# Patient Record
Sex: FEMALE | NOT HISPANIC OR LATINO
[De-identification: names, ages, dates, MRNs, and addresses within clinical notes are randomized per-mention and may not be internally consistent; named-entity substitution may affect disease eponyms.]

---

## 2017-10-06 PROBLEM — Z00.00 ENCOUNTER FOR PREVENTIVE HEALTH EXAMINATION: Status: ACTIVE | Noted: 2017-10-06

## 2017-10-10 ENCOUNTER — APPOINTMENT (OUTPATIENT)
Dept: OTHER | Facility: CLINIC | Age: 37
End: 2017-10-10
Payer: COMMERCIAL

## 2017-10-10 PROCEDURE — 99243 OFF/OP CNSLTJ NEW/EST LOW 30: CPT

## 2017-10-13 ENCOUNTER — OUTPATIENT (OUTPATIENT)
Dept: OUTPATIENT SERVICES | Facility: HOSPITAL | Age: 37
LOS: 1 days | End: 2017-10-13
Payer: COMMERCIAL

## 2017-10-13 ENCOUNTER — APPOINTMENT (OUTPATIENT)
Dept: ANTEPARTUM | Facility: CLINIC | Age: 37
End: 2017-10-13
Payer: COMMERCIAL

## 2017-10-13 ENCOUNTER — ASOB RESULT (OUTPATIENT)
Age: 37
End: 2017-10-13

## 2017-10-13 DIAGNOSIS — Z3A.00 WEEKS OF GESTATION OF PREGNANCY NOT SPECIFIED: ICD-10-CM

## 2017-10-13 DIAGNOSIS — O26.899 OTHER SPECIFIED PREGNANCY RELATED CONDITIONS, UNSPECIFIED TRIMESTER: ICD-10-CM

## 2017-10-13 LAB
BASOPHILS # BLD AUTO: 0 K/UL — SIGNIFICANT CHANGE UP (ref 0–0.2)
BASOPHILS NFR BLD AUTO: 0.3 % — SIGNIFICANT CHANGE UP (ref 0–2)
BLD GP AB SCN SERPL QL: NEGATIVE — SIGNIFICANT CHANGE UP
EOSINOPHIL # BLD AUTO: 0.1 K/UL — SIGNIFICANT CHANGE UP (ref 0–0.5)
EOSINOPHIL NFR BLD AUTO: 0.5 % — SIGNIFICANT CHANGE UP (ref 0–6)
HCT VFR BLD CALC: 30.8 % — LOW (ref 34.5–45)
HGB BLD-MCNC: 10.8 G/DL — LOW (ref 11.5–15.5)
LYMPHOCYTES # BLD AUTO: 16.9 % — SIGNIFICANT CHANGE UP (ref 13–44)
LYMPHOCYTES # BLD AUTO: 2.2 K/UL — SIGNIFICANT CHANGE UP (ref 1–3.3)
MCHC RBC-ENTMCNC: 33.3 PG — SIGNIFICANT CHANGE UP (ref 27–34)
MCHC RBC-ENTMCNC: 35.1 GM/DL — SIGNIFICANT CHANGE UP (ref 32–36)
MCV RBC AUTO: 95 FL — SIGNIFICANT CHANGE UP (ref 80–100)
MONOCYTES # BLD AUTO: 1 K/UL — HIGH (ref 0–0.9)
MONOCYTES NFR BLD AUTO: 8 % — SIGNIFICANT CHANGE UP (ref 2–14)
NEUTROPHILS # BLD AUTO: 9.6 K/UL — HIGH (ref 1.8–7.4)
NEUTROPHILS NFR BLD AUTO: 74.3 % — SIGNIFICANT CHANGE UP (ref 43–77)
PLATELET # BLD AUTO: 363 K/UL — SIGNIFICANT CHANGE UP (ref 150–400)
RBC # BLD: 3.24 M/UL — LOW (ref 3.8–5.2)
RBC # FLD: 11.9 % — SIGNIFICANT CHANGE UP (ref 10.3–14.5)
RH IG SCN BLD-IMP: POSITIVE — SIGNIFICANT CHANGE UP
WBC # BLD: 12.9 K/UL — HIGH (ref 3.8–10.5)
WBC # FLD AUTO: 12.9 K/UL — HIGH (ref 3.8–10.5)

## 2017-10-13 PROCEDURE — G0463: CPT

## 2017-10-13 PROCEDURE — 85027 COMPLETE CBC AUTOMATED: CPT

## 2017-10-13 PROCEDURE — 86850 RBC ANTIBODY SCREEN: CPT

## 2017-10-13 PROCEDURE — 86900 BLOOD TYPING SEROLOGIC ABO: CPT

## 2017-10-13 PROCEDURE — 76817 TRANSVAGINAL US OBSTETRIC: CPT

## 2017-10-13 PROCEDURE — 59025 FETAL NON-STRESS TEST: CPT

## 2017-10-13 PROCEDURE — 76818 FETAL BIOPHYS PROFILE W/NST: CPT

## 2017-10-13 PROCEDURE — 86901 BLOOD TYPING SEROLOGIC RH(D): CPT

## 2017-10-13 PROCEDURE — 76818 FETAL BIOPHYS PROFILE W/NST: CPT | Mod: 26

## 2017-10-13 PROCEDURE — 99242 OFF/OP CONSLTJ NEW/EST SF 20: CPT | Mod: 25

## 2017-10-13 PROCEDURE — 86780 TREPONEMA PALLIDUM: CPT

## 2017-10-13 PROCEDURE — 76816 OB US FOLLOW-UP PER FETUS: CPT | Mod: 26

## 2017-10-13 PROCEDURE — 76816 OB US FOLLOW-UP PER FETUS: CPT

## 2017-10-13 PROCEDURE — 76817 TRANSVAGINAL US OBSTETRIC: CPT | Mod: 26

## 2017-10-13 RX ORDER — SODIUM CHLORIDE 9 MG/ML
1000 INJECTION INTRAMUSCULAR; INTRAVENOUS; SUBCUTANEOUS ONCE
Qty: 0 | Refills: 0 | Status: DISCONTINUED | OUTPATIENT
Start: 2017-10-13 | End: 2017-10-28

## 2017-10-14 LAB — T PALLIDUM AB TITR SER: NEGATIVE — SIGNIFICANT CHANGE UP

## 2017-10-17 ENCOUNTER — ASOB RESULT (OUTPATIENT)
Age: 37
End: 2017-10-17

## 2017-10-17 ENCOUNTER — APPOINTMENT (OUTPATIENT)
Dept: ANTEPARTUM | Facility: CLINIC | Age: 37
End: 2017-10-17
Payer: COMMERCIAL

## 2017-10-17 PROCEDURE — 76817 TRANSVAGINAL US OBSTETRIC: CPT

## 2017-10-18 DIAGNOSIS — O40.3XX0 POLYHYDRAMNIOS, THIRD TRIMESTER, NOT APPLICABLE OR UNSPECIFIED: ICD-10-CM

## 2017-10-18 DIAGNOSIS — Z01.818 ENCOUNTER FOR OTHER PREPROCEDURAL EXAMINATION: ICD-10-CM

## 2017-10-18 DIAGNOSIS — O09.513 SUPERVISION OF ELDERLY PRIMIGRAVIDA, THIRD TRIMESTER: ICD-10-CM

## 2017-10-23 ENCOUNTER — ASOB RESULT (OUTPATIENT)
Age: 37
End: 2017-10-23

## 2017-10-23 ENCOUNTER — APPOINTMENT (OUTPATIENT)
Dept: ANTEPARTUM | Facility: CLINIC | Age: 37
End: 2017-10-23
Payer: COMMERCIAL

## 2017-10-23 PROCEDURE — 76815 OB US LIMITED FETUS(S): CPT

## 2017-10-30 ENCOUNTER — ASOB RESULT (OUTPATIENT)
Age: 37
End: 2017-10-30

## 2017-10-30 ENCOUNTER — OUTPATIENT (OUTPATIENT)
Dept: OUTPATIENT SERVICES | Facility: HOSPITAL | Age: 37
LOS: 1 days | End: 2017-10-30
Payer: COMMERCIAL

## 2017-10-30 ENCOUNTER — APPOINTMENT (OUTPATIENT)
Dept: ANTEPARTUM | Facility: CLINIC | Age: 37
End: 2017-10-30
Payer: COMMERCIAL

## 2017-10-30 DIAGNOSIS — Z01.818 ENCOUNTER FOR OTHER PREPROCEDURAL EXAMINATION: ICD-10-CM

## 2017-10-30 PROCEDURE — 76818 FETAL BIOPHYS PROFILE W/NST: CPT | Mod: 26

## 2017-10-30 PROCEDURE — 99214 OFFICE O/P EST MOD 30 MIN: CPT | Mod: 25

## 2017-10-30 PROCEDURE — 76817 TRANSVAGINAL US OBSTETRIC: CPT

## 2017-10-30 PROCEDURE — 76817 TRANSVAGINAL US OBSTETRIC: CPT | Mod: 26

## 2017-10-30 PROCEDURE — 76818 FETAL BIOPHYS PROFILE W/NST: CPT

## 2017-11-02 DIAGNOSIS — O09.513 SUPERVISION OF ELDERLY PRIMIGRAVIDA, THIRD TRIMESTER: ICD-10-CM

## 2017-11-02 DIAGNOSIS — O99.013 ANEMIA COMPLICATING PREGNANCY, THIRD TRIMESTER: ICD-10-CM

## 2017-11-06 ENCOUNTER — OUTPATIENT (OUTPATIENT)
Dept: OUTPATIENT SERVICES | Facility: HOSPITAL | Age: 37
LOS: 1 days | End: 2017-11-06
Payer: COMMERCIAL

## 2017-11-06 ENCOUNTER — APPOINTMENT (OUTPATIENT)
Dept: ANTEPARTUM | Facility: CLINIC | Age: 37
End: 2017-11-06
Payer: COMMERCIAL

## 2017-11-06 ENCOUNTER — ASOB RESULT (OUTPATIENT)
Age: 37
End: 2017-11-06

## 2017-11-06 ENCOUNTER — INPATIENT (INPATIENT)
Facility: HOSPITAL | Age: 37
LOS: 20 days | Discharge: HOME | End: 2017-11-27
Attending: OBSTETRICS & GYNECOLOGY | Admitting: OBSTETRICS & GYNECOLOGY
Payer: COMMERCIAL

## 2017-11-06 VITALS
TEMPERATURE: 98 F | SYSTOLIC BLOOD PRESSURE: 107 MMHG | DIASTOLIC BLOOD PRESSURE: 72 MMHG | RESPIRATION RATE: 18 BRPM | WEIGHT: 182.1 LBS | HEART RATE: 93 BPM | HEIGHT: 64 IN | OXYGEN SATURATION: 97 %

## 2017-11-06 DIAGNOSIS — O26.899 OTHER SPECIFIED PREGNANCY RELATED CONDITIONS, UNSPECIFIED TRIMESTER: ICD-10-CM

## 2017-11-06 DIAGNOSIS — Z01.818 ENCOUNTER FOR OTHER PREPROCEDURAL EXAMINATION: ICD-10-CM

## 2017-11-06 DIAGNOSIS — Z3A.00 WEEKS OF GESTATION OF PREGNANCY NOT SPECIFIED: ICD-10-CM

## 2017-11-06 DIAGNOSIS — Z34.80 ENCOUNTER FOR SUPERVISION OF OTHER NORMAL PREGNANCY, UNSPECIFIED TRIMESTER: ICD-10-CM

## 2017-11-06 LAB
BASOPHILS # BLD AUTO: 0.1 K/UL — SIGNIFICANT CHANGE UP (ref 0–0.2)
BASOPHILS NFR BLD AUTO: 0.4 % — SIGNIFICANT CHANGE UP (ref 0–2)
BLD GP AB SCN SERPL QL: NEGATIVE — SIGNIFICANT CHANGE UP
EOSINOPHIL # BLD AUTO: 0 K/UL — SIGNIFICANT CHANGE UP (ref 0–0.5)
EOSINOPHIL NFR BLD AUTO: 0.3 % — SIGNIFICANT CHANGE UP (ref 0–6)
HCT VFR BLD CALC: 32.2 % — LOW (ref 34.5–45)
HGB BLD-MCNC: 10.9 G/DL — LOW (ref 11.5–15.5)
LYMPHOCYTES # BLD AUTO: 14.8 % — SIGNIFICANT CHANGE UP (ref 13–44)
LYMPHOCYTES # BLD AUTO: 2 K/UL — SIGNIFICANT CHANGE UP (ref 1–3.3)
MCHC RBC-ENTMCNC: 31.7 PG — SIGNIFICANT CHANGE UP (ref 27–34)
MCHC RBC-ENTMCNC: 34 GM/DL — SIGNIFICANT CHANGE UP (ref 32–36)
MCV RBC AUTO: 93.4 FL — SIGNIFICANT CHANGE UP (ref 80–100)
MONOCYTES # BLD AUTO: 1.1 K/UL — HIGH (ref 0–0.9)
MONOCYTES NFR BLD AUTO: 8 % — SIGNIFICANT CHANGE UP (ref 2–14)
NEUTROPHILS # BLD AUTO: 10.6 K/UL — HIGH (ref 1.8–7.4)
NEUTROPHILS NFR BLD AUTO: 76.5 % — SIGNIFICANT CHANGE UP (ref 43–77)
PLATELET # BLD AUTO: 359 K/UL — SIGNIFICANT CHANGE UP (ref 150–400)
RBC # BLD: 3.44 M/UL — LOW (ref 3.8–5.2)
RBC # FLD: 12.1 % — SIGNIFICANT CHANGE UP (ref 10.3–14.5)
RH IG SCN BLD-IMP: POSITIVE — SIGNIFICANT CHANGE UP
T PALLIDUM AB TITR SER: NEGATIVE — SIGNIFICANT CHANGE UP
WBC # BLD: 13.8 K/UL — HIGH (ref 3.8–10.5)
WBC # FLD AUTO: 13.8 K/UL — HIGH (ref 3.8–10.5)

## 2017-11-06 PROCEDURE — 76816 OB US FOLLOW-UP PER FETUS: CPT

## 2017-11-06 PROCEDURE — 76818 FETAL BIOPHYS PROFILE W/NST: CPT | Mod: 26

## 2017-11-06 PROCEDURE — 76819 FETAL BIOPHYS PROFIL W/O NST: CPT | Mod: 59

## 2017-11-06 PROCEDURE — 99214 OFFICE O/P EST MOD 30 MIN: CPT | Mod: 25

## 2017-11-06 PROCEDURE — 59025 FETAL NON-STRESS TEST: CPT | Mod: 26

## 2017-11-06 PROCEDURE — 76819 FETAL BIOPHYS PROFIL W/O NST: CPT

## 2017-11-06 PROCEDURE — 76817 TRANSVAGINAL US OBSTETRIC: CPT

## 2017-11-06 PROCEDURE — 76818 FETAL BIOPHYS PROFILE W/NST: CPT

## 2017-11-06 RX ORDER — FOLIC ACID 0.8 MG
1 TABLET ORAL DAILY
Qty: 0 | Refills: 0 | Status: DISCONTINUED | OUTPATIENT
Start: 2017-11-06 | End: 2017-11-27

## 2017-11-06 RX ORDER — ALBUTEROL 90 UG/1
2 AEROSOL, METERED ORAL EVERY 6 HOURS
Qty: 0 | Refills: 0 | Status: DISCONTINUED | OUTPATIENT
Start: 2017-11-06 | End: 2017-11-27

## 2017-11-06 RX ADMIN — Medication 12 MILLIGRAM(S): at 16:41

## 2017-11-06 RX ADMIN — Medication 1 MILLIGRAM(S): at 16:41

## 2017-11-06 NOTE — H&P ADULT - ATTENDING COMMENTS
36 yo  @ 32 4/7  weeks, EDC 17, with a vasa previa.  She is being admitted for betamethasone and in house observation  She feels well, no contractions, no VB, no LOF, good fetal movement reported.  No HA, no chills, fever, n/v/d.  No dysuria. No CP, no SOB.  MedHx: Asthma  Meds: Nexium, PNV, inhaler prn  SurgHx: LEEP , Lipoma 10/2016  ALL: Egg  Fhx: NC  Soc: denies toxic habits  GynHx: LEEP    VS: stable, afebrile, normotensive  CV:RRR, S1S2  Resp:CTABL  Abd:gravid, NT  Ext:NTBL, no edema  VE:deferred  [facility] and stable    36 yo  @ 32 4/7, with vasa previa  Vasa previa: Repeat MFM sono in 1 weeks  In house monitoring  NST daily  Modified in house be rest.  Delivery at 35-36 weeks via scheduled c/s  MFM follow up appreciated    Fetal well being:   NST daily  IM betamethasone today and tomorrow for lung maturity  NICU consult  C/W PNV    Asthma: Home meds prn    Diet: regular    Reflux: Nexium prn.    Sumaya Morgan MD 38 yo  @ 32 4/7  weeks, EDC 17, with a vasa previa.  She is being admitted for betamethasone and in house observation  She feels well, no contractions, no VB, no LOF, good fetal movement reported.  No HA, no chills, fever, n/v/d.  No dysuria. No CP, no SOB.  MedHx: Asthma  Meds: Nexium, PNV, inhaler prn  SurgHx: LEEP , Lipoma 10/2016  ALL: Egg  Fhx: NC  Soc: denies toxic habits  GynHx: LEEP    VS: stable, afebrile, normotensive  CV:RRR, S1S2  Resp:CTABL  Abd:gravid, NT  Ext:NTBL, no edema  VE:deferred  [facility] and stable    38 yo  @ 32 4/7, with vasa previa  Vasa previa: Repeat MFM sono in 1 weeks  In house monitoring  NST daily  Modified in house be rest.  Delivery at 35-36 weeks via scheduled c/s  MFM follow up appreciated  Maintain active type and screen    Fetal well being:   NST daily  IM betamethasone today and tomorrow for lung maturity  NICU consult  C/W PNV    Asthma: Home meds prn    Diet: regular    Reflux: Nexium prn.    Sumaya Morgan MD

## 2017-11-07 ENCOUNTER — APPOINTMENT (OUTPATIENT)
Dept: ANTEPARTUM | Facility: CLINIC | Age: 37
End: 2017-11-07

## 2017-11-07 PROCEDURE — 59025 FETAL NON-STRESS TEST: CPT | Mod: 26

## 2017-11-07 RX ADMIN — Medication 12 MILLIGRAM(S): at 16:33

## 2017-11-08 ENCOUNTER — TRANSCRIPTION ENCOUNTER (OUTPATIENT)
Age: 37
End: 2017-11-08

## 2017-11-08 PROCEDURE — 59025 FETAL NON-STRESS TEST: CPT | Mod: 26

## 2017-11-08 RX ORDER — DIPHENHYDRAMINE HCL 50 MG
25 CAPSULE ORAL ONCE
Qty: 0 | Refills: 0 | Status: COMPLETED | OUTPATIENT
Start: 2017-11-08 | End: 2017-11-08

## 2017-11-08 RX ADMIN — Medication 25 MILLIGRAM(S): at 22:40

## 2017-11-09 LAB
BLD GP AB SCN SERPL QL: NEGATIVE — SIGNIFICANT CHANGE UP
RH IG SCN BLD-IMP: POSITIVE — SIGNIFICANT CHANGE UP

## 2017-11-09 PROCEDURE — 59025 FETAL NON-STRESS TEST: CPT | Mod: 26

## 2017-11-09 RX ORDER — DIPHENHYDRAMINE HCL 50 MG
25 CAPSULE ORAL ONCE
Qty: 0 | Refills: 0 | Status: COMPLETED | OUTPATIENT
Start: 2017-11-09 | End: 2017-11-09

## 2017-11-09 RX ADMIN — Medication 1 MILLIGRAM(S): at 11:40

## 2017-11-09 RX ADMIN — Medication 1 TABLET(S): at 11:41

## 2017-11-09 RX ADMIN — Medication 25 MILLIGRAM(S): at 22:33

## 2017-11-10 PROCEDURE — 59025 FETAL NON-STRESS TEST: CPT | Mod: 26

## 2017-11-10 RX ORDER — TETANUS TOXOID, REDUCED DIPHTHERIA TOXOID AND ACELLULAR PERTUSSIS VACCINE, ADSORBED 5; 2.5; 8; 8; 2.5 [IU]/.5ML; [IU]/.5ML; UG/.5ML; UG/.5ML; UG/.5ML
0.5 SUSPENSION INTRAMUSCULAR ONCE
Qty: 0 | Refills: 0 | Status: DISCONTINUED | OUTPATIENT
Start: 2017-11-10 | End: 2017-11-11

## 2017-11-10 RX ADMIN — Medication 1 MILLIGRAM(S): at 12:44

## 2017-11-10 RX ADMIN — Medication 1 TABLET(S): at 12:45

## 2017-11-11 PROCEDURE — 59025 FETAL NON-STRESS TEST: CPT | Mod: 26

## 2017-11-11 RX ORDER — TETANUS TOXOID, REDUCED DIPHTHERIA TOXOID AND ACELLULAR PERTUSSIS VACCINE, ADSORBED 5; 2.5; 8; 8; 2.5 [IU]/.5ML; [IU]/.5ML; UG/.5ML; UG/.5ML; UG/.5ML
0.5 SUSPENSION INTRAMUSCULAR ONCE
Qty: 0 | Refills: 0 | Status: COMPLETED | OUTPATIENT
Start: 2017-11-11 | End: 2017-11-11

## 2017-11-11 RX ADMIN — TETANUS TOXOID, REDUCED DIPHTHERIA TOXOID AND ACELLULAR PERTUSSIS VACCINE, ADSORBED 0.5 MILLILITER(S): 5; 2.5; 8; 8; 2.5 SUSPENSION INTRAMUSCULAR at 07:00

## 2017-11-12 PROCEDURE — 59025 FETAL NON-STRESS TEST: CPT | Mod: 26

## 2017-11-12 RX ADMIN — Medication 1 MILLIGRAM(S): at 12:19

## 2017-11-12 RX ADMIN — Medication 1 TABLET(S): at 12:19

## 2017-11-13 ENCOUNTER — ASOB RESULT (OUTPATIENT)
Age: 37
End: 2017-11-13

## 2017-11-13 ENCOUNTER — APPOINTMENT (OUTPATIENT)
Dept: ANTEPARTUM | Facility: CLINIC | Age: 37
End: 2017-11-13

## 2017-11-13 DIAGNOSIS — O99.013 ANEMIA COMPLICATING PREGNANCY, THIRD TRIMESTER: ICD-10-CM

## 2017-11-13 DIAGNOSIS — O09.513 SUPERVISION OF ELDERLY PRIMIGRAVIDA, THIRD TRIMESTER: ICD-10-CM

## 2017-11-13 DIAGNOSIS — O40.3XX0 POLYHYDRAMNIOS, THIRD TRIMESTER, NOT APPLICABLE OR UNSPECIFIED: ICD-10-CM

## 2017-11-13 PROCEDURE — 59025 FETAL NON-STRESS TEST: CPT | Mod: 26

## 2017-11-13 RX ORDER — DIPHENHYDRAMINE HCL 50 MG
25 CAPSULE ORAL ONCE
Qty: 0 | Refills: 0 | Status: COMPLETED | OUTPATIENT
Start: 2017-11-13 | End: 2017-11-13

## 2017-11-13 RX ADMIN — Medication 25 MILLIGRAM(S): at 22:46

## 2017-11-13 RX ADMIN — Medication 1 MILLIGRAM(S): at 12:08

## 2017-11-13 RX ADMIN — Medication 1 TABLET(S): at 12:08

## 2017-11-14 PROCEDURE — 59025 FETAL NON-STRESS TEST: CPT | Mod: 26

## 2017-11-14 RX ADMIN — Medication 1 MILLIGRAM(S): at 11:34

## 2017-11-14 RX ADMIN — Medication 1 TABLET(S): at 11:35

## 2017-11-14 NOTE — DIETITIAN INITIAL EVALUATION ADULT. - OTHER INFO
Pt is  a G1PO admitted with vasa previa for prolonged hospitalization. Pt does not recall pre-pregnancy wt but endorses weight gain within normal limits. Appetite good, denies GI distress. Family providing favorite foods from home. Food allergy to eggs verified.

## 2017-11-15 PROCEDURE — 59025 FETAL NON-STRESS TEST: CPT | Mod: 26,76,59

## 2017-11-15 RX ADMIN — Medication 1 TABLET(S): at 13:00

## 2017-11-15 RX ADMIN — Medication 1 MILLIGRAM(S): at 12:59

## 2017-11-16 PROCEDURE — 59025 FETAL NON-STRESS TEST: CPT | Mod: 26,76,59

## 2017-11-17 ENCOUNTER — APPOINTMENT (OUTPATIENT)
Dept: ANTEPARTUM | Facility: CLINIC | Age: 37
End: 2017-11-17

## 2017-11-17 PROCEDURE — 59025 FETAL NON-STRESS TEST: CPT | Mod: 26

## 2017-11-17 RX ADMIN — Medication 1 MILLIGRAM(S): at 12:31

## 2017-11-17 RX ADMIN — Medication 1 TABLET(S): at 12:31

## 2017-11-18 PROCEDURE — 59025 FETAL NON-STRESS TEST: CPT | Mod: 26

## 2017-11-20 ENCOUNTER — APPOINTMENT (OUTPATIENT)
Dept: ANTEPARTUM | Facility: CLINIC | Age: 37
End: 2017-11-20

## 2017-11-20 ENCOUNTER — ASOB RESULT (OUTPATIENT)
Age: 37
End: 2017-11-20

## 2017-11-20 PROCEDURE — 59025 FETAL NON-STRESS TEST: CPT | Mod: 26

## 2017-11-20 PROCEDURE — 76819 FETAL BIOPHYS PROFIL W/O NST: CPT | Mod: 26

## 2017-11-20 RX ADMIN — Medication 1 MILLIGRAM(S): at 12:44

## 2017-11-21 PROCEDURE — 59025 FETAL NON-STRESS TEST: CPT | Mod: 26

## 2017-11-21 RX ADMIN — Medication 1 MILLIGRAM(S): at 11:30

## 2017-11-21 RX ADMIN — Medication 1 TABLET(S): at 11:30

## 2017-11-22 ENCOUNTER — RESULT REVIEW (OUTPATIENT)
Age: 37
End: 2017-11-22

## 2017-11-22 ENCOUNTER — TRANSCRIPTION ENCOUNTER (OUTPATIENT)
Age: 37
End: 2017-11-22

## 2017-11-22 PROCEDURE — 59025 FETAL NON-STRESS TEST: CPT | Mod: 26

## 2017-11-22 RX ORDER — SODIUM CHLORIDE 9 MG/ML
500 INJECTION, SOLUTION INTRAVENOUS ONCE
Qty: 0 | Refills: 0 | Status: DISCONTINUED | OUTPATIENT
Start: 2017-11-22 | End: 2017-11-27

## 2017-11-22 RX ADMIN — Medication 1 TABLET(S): at 11:39

## 2017-11-22 RX ADMIN — Medication 1 MILLIGRAM(S): at 11:39

## 2017-11-23 LAB
HCT VFR BLD CALC: 27.4 % — LOW (ref 34.5–45)
HGB BLD-MCNC: 9.5 G/DL — LOW (ref 11.5–15.5)
MCHC RBC-ENTMCNC: 31.9 PG — SIGNIFICANT CHANGE UP (ref 27–34)
MCHC RBC-ENTMCNC: 34.6 GM/DL — SIGNIFICANT CHANGE UP (ref 32–36)
MCV RBC AUTO: 92.3 FL — SIGNIFICANT CHANGE UP (ref 80–100)
PLATELET # BLD AUTO: 306 K/UL — SIGNIFICANT CHANGE UP (ref 150–400)
RBC # BLD: 2.97 M/UL — LOW (ref 3.8–5.2)
RBC # FLD: 12.7 % — SIGNIFICANT CHANGE UP (ref 10.3–14.5)
WBC # BLD: 16.6 K/UL — HIGH (ref 3.8–10.5)
WBC # FLD AUTO: 16.6 K/UL — HIGH (ref 3.8–10.5)

## 2017-11-23 RX ORDER — KETOROLAC TROMETHAMINE 30 MG/ML
30 SYRINGE (ML) INJECTION EVERY 6 HOURS
Qty: 0 | Refills: 0 | Status: DISCONTINUED | OUTPATIENT
Start: 2017-11-23 | End: 2017-11-25

## 2017-11-23 RX ORDER — SODIUM CHLORIDE 9 MG/ML
1000 INJECTION, SOLUTION INTRAVENOUS ONCE
Qty: 0 | Refills: 0 | Status: DISCONTINUED | OUTPATIENT
Start: 2017-11-23 | End: 2017-11-27

## 2017-11-23 RX ORDER — ONDANSETRON 8 MG/1
4 TABLET, FILM COATED ORAL EVERY 6 HOURS
Qty: 0 | Refills: 0 | Status: DISCONTINUED | OUTPATIENT
Start: 2017-11-23 | End: 2017-11-25

## 2017-11-23 RX ORDER — SODIUM CHLORIDE 9 MG/ML
1000 INJECTION, SOLUTION INTRAVENOUS
Qty: 0 | Refills: 0 | Status: DISCONTINUED | OUTPATIENT
Start: 2017-11-23 | End: 2017-11-27

## 2017-11-23 RX ORDER — OXYCODONE HYDROCHLORIDE 5 MG/1
5 TABLET ORAL
Qty: 0 | Refills: 0 | Status: COMPLETED | OUTPATIENT
Start: 2017-11-23 | End: 2017-11-30

## 2017-11-23 RX ORDER — OXYTOCIN 10 UNIT/ML
333.33 VIAL (ML) INJECTION
Qty: 20 | Refills: 0 | Status: DISCONTINUED | OUTPATIENT
Start: 2017-11-23 | End: 2017-11-27

## 2017-11-23 RX ORDER — DEXAMETHASONE 0.5 MG/5ML
4 ELIXIR ORAL EVERY 6 HOURS
Qty: 0 | Refills: 0 | Status: DISCONTINUED | OUTPATIENT
Start: 2017-11-23 | End: 2017-11-25

## 2017-11-23 RX ORDER — OXYTOCIN 10 UNIT/ML
41.67 VIAL (ML) INJECTION
Qty: 20 | Refills: 0 | Status: DISCONTINUED | OUTPATIENT
Start: 2017-11-23 | End: 2017-11-23

## 2017-11-23 RX ORDER — NALOXONE HYDROCHLORIDE 4 MG/.1ML
0.1 SPRAY NASAL
Qty: 0 | Refills: 0 | Status: DISCONTINUED | OUTPATIENT
Start: 2017-11-23 | End: 2017-11-25

## 2017-11-23 RX ORDER — GLYCERIN ADULT
1 SUPPOSITORY, RECTAL RECTAL AT BEDTIME
Qty: 0 | Refills: 0 | Status: DISCONTINUED | OUTPATIENT
Start: 2017-11-23 | End: 2017-11-27

## 2017-11-23 RX ORDER — DOCUSATE SODIUM 100 MG
100 CAPSULE ORAL
Qty: 0 | Refills: 0 | Status: DISCONTINUED | OUTPATIENT
Start: 2017-11-23 | End: 2017-11-24

## 2017-11-23 RX ORDER — HEPARIN SODIUM 5000 [USP'U]/ML
5000 INJECTION INTRAVENOUS; SUBCUTANEOUS EVERY 12 HOURS
Qty: 0 | Refills: 0 | Status: DISCONTINUED | OUTPATIENT
Start: 2017-11-23 | End: 2017-11-27

## 2017-11-23 RX ORDER — IBUPROFEN 200 MG
600 TABLET ORAL EVERY 6 HOURS
Qty: 0 | Refills: 0 | Status: COMPLETED | OUTPATIENT
Start: 2017-11-23 | End: 2018-10-22

## 2017-11-23 RX ORDER — DIPHENHYDRAMINE HCL 50 MG
25 CAPSULE ORAL EVERY 6 HOURS
Qty: 0 | Refills: 0 | Status: DISCONTINUED | OUTPATIENT
Start: 2017-11-23 | End: 2017-11-27

## 2017-11-23 RX ORDER — CITRIC ACID/SODIUM CITRATE 300-500 MG
30 SOLUTION, ORAL ORAL ONCE
Qty: 0 | Refills: 0 | Status: COMPLETED | OUTPATIENT
Start: 2017-11-23 | End: 2017-11-23

## 2017-11-23 RX ORDER — METOCLOPRAMIDE HCL 10 MG
10 TABLET ORAL ONCE
Qty: 0 | Refills: 0 | Status: DISCONTINUED | OUTPATIENT
Start: 2017-11-23 | End: 2017-11-27

## 2017-11-23 RX ORDER — ACETAMINOPHEN 500 MG
975 TABLET ORAL EVERY 6 HOURS
Qty: 0 | Refills: 0 | Status: DISCONTINUED | OUTPATIENT
Start: 2017-11-23 | End: 2017-11-26

## 2017-11-23 RX ORDER — FERROUS SULFATE 325(65) MG
325 TABLET ORAL DAILY
Qty: 0 | Refills: 0 | Status: DISCONTINUED | OUTPATIENT
Start: 2017-11-23 | End: 2017-11-24

## 2017-11-23 RX ORDER — LANOLIN
1 OINTMENT (GRAM) TOPICAL
Qty: 0 | Refills: 0 | Status: DISCONTINUED | OUTPATIENT
Start: 2017-11-23 | End: 2017-11-27

## 2017-11-23 RX ORDER — SIMETHICONE 80 MG/1
80 TABLET, CHEWABLE ORAL EVERY 4 HOURS
Qty: 0 | Refills: 0 | Status: DISCONTINUED | OUTPATIENT
Start: 2017-11-23 | End: 2017-11-27

## 2017-11-23 RX ORDER — OXYCODONE HYDROCHLORIDE 5 MG/1
5 TABLET ORAL EVERY 4 HOURS
Qty: 0 | Refills: 0 | Status: COMPLETED | OUTPATIENT
Start: 2017-11-23 | End: 2017-11-30

## 2017-11-23 RX ORDER — FAMOTIDINE 10 MG/ML
20 INJECTION INTRAVENOUS ONCE
Qty: 0 | Refills: 0 | Status: DISCONTINUED | OUTPATIENT
Start: 2017-11-23 | End: 2017-11-27

## 2017-11-23 RX ADMIN — Medication 30 MILLIGRAM(S): at 14:59

## 2017-11-23 RX ADMIN — Medication 30 MILLIGRAM(S): at 08:37

## 2017-11-23 RX ADMIN — HEPARIN SODIUM 5000 UNIT(S): 5000 INJECTION INTRAVENOUS; SUBCUTANEOUS at 17:29

## 2017-11-23 RX ADMIN — Medication 975 MILLIGRAM(S): at 17:30

## 2017-11-23 RX ADMIN — Medication 975 MILLIGRAM(S): at 18:00

## 2017-11-23 RX ADMIN — Medication 30 MILLIGRAM(S): at 21:43

## 2017-11-23 RX ADMIN — Medication 1000 MILLIUNIT(S)/MIN: at 03:22

## 2017-11-23 RX ADMIN — Medication 30 MILLIGRAM(S): at 09:00

## 2017-11-23 RX ADMIN — Medication 30 MILLIGRAM(S): at 22:15

## 2017-11-23 RX ADMIN — Medication 30 MILLIGRAM(S): at 15:47

## 2017-11-23 RX ADMIN — Medication 1 MILLIGRAM(S): at 12:36

## 2017-11-23 RX ADMIN — Medication 100 MILLIGRAM(S): at 21:43

## 2017-11-23 RX ADMIN — Medication 975 MILLIGRAM(S): at 13:00

## 2017-11-23 RX ADMIN — Medication 325 MILLIGRAM(S): at 12:36

## 2017-11-23 RX ADMIN — Medication 975 MILLIGRAM(S): at 12:36

## 2017-11-23 RX ADMIN — Medication 30 MILLILITER(S): at 00:35

## 2017-11-23 NOTE — CHART NOTE - NSCHARTNOTEFT_GEN_A_CORE
Day 2 of Anesthesia Pain Management Service    SUBJECTIVE: Patient doing well with PCEA, will continue current management.    Pain Scale Score:   Refer to charted pain scores    THERAPY:  [X] Epidural Bupivacaine 0.0625% and Hydromorphone         [X] 10 micrograms/mL 	[ ] 5 micrograms/mL  [ ] Epidural Bupivacaine 0.0625% and Fentanyl 2 micrograms/mL  [ ] Epidural Ropivacaine 0.1% plain – 1 mg/mL    Demand dose: 3 mL  Lockout: 15 minutes  Continuous Rate: 10 mL/hr    MEDICATIONS  (STANDING):  acetaminophen   Tablet. 975 milliGRAM(s) Oral every 6 hours  famotidine Injectable 20 milliGRAM(s) IV Push once  fentaNYL (3 MICROgram(s)/mL) + BUpivacaine 0.01% in 0.9% Sodium Chloride PCEA 250 milliLiter(s) Epidural PCA Continuous  ferrous    sulfate 325 milliGRAM(s) Oral daily  folic acid 1 milliGRAM(s) Oral daily  heparin  Injectable 5000 Unit(s) SubCutaneous every 12 hours  ibuprofen  Tablet 600 milliGRAM(s) Oral every 6 hours  ketorolac   Injectable 30 milliGRAM(s) IV Push every 6 hours  lactated ringers Bolus 500 milliLiter(s) IV Bolus once  lactated ringers Bolus 1000 milliLiter(s) IV Bolus once  lactated ringers. 1000 milliLiter(s) (125 mL/Hr) IV Continuous <Continuous>  oxyCODONE    IR 5 milliGRAM(s) Oral every 3 hours  oxytocin Infusion 333.333 milliUNIT(s)/Min (1000 mL/Hr) IV Continuous <Continuous>  prenatal multivitamin 1 Tablet(s) Oral daily  prenatal multivitamin 1 Tablet(s) Oral daily    MEDICATIONS  (PRN):  ALBUTerol    90 MICROgram(s) HFA Inhaler 2 Puff(s) Inhalation every 6 hours PRN Shortness of Breath and/or Wheezing  dexamethasone  Injectable 4 milliGRAM(s) IV Push every 6 hours PRN Nausea, IF ondansetron is ineffective after 30 - 60 minutes  diphenhydrAMINE   Capsule 25 milliGRAM(s) Oral every 6 hours PRN Itching  docusate sodium 100 milliGRAM(s) Oral two times a day PRN Stool Softening  fentaNYL (3 MICROgram(s)/mL) + BUpivacaine 0.01% in 0.9% Sodium Chloride PCEA Rescue Clinician Bolus 5 milliLiter(s) Epidural every 15 minutes PRN Moderate Pain (4 - 6)  glycerin Suppository - Adult 1 Suppository(s) Rectal at bedtime PRN Constipation  lanolin Ointment 1 Application(s) Topical every 3 hours PRN Sore Nipples  metoclopramide Injectable 10 milliGRAM(s) IV Push once PRN Nausea and/or Vomiting  naloxone Injectable 0.1 milliGRAM(s) IV Push every 3 minutes PRN For ANY of the following changes in patient status:  A. RR LESS THAN 10 breaths per minute, B. Oxygen saturation LESS THAN 90%, C. Sedation score of 6  ondansetron Injectable 4 milliGRAM(s) IV Push every 6 hours PRN Nausea  oxyCODONE    IR 5 milliGRAM(s) Oral every 4 hours PRN Severe Pain (7 - 10)  simethicone 80 milliGRAM(s) Chew every 4 hours PRN Gas      OBJECTIVE:    Assessment of Catheter Site:    [X] Epidural 	  [X] Dressing intact	[X] Site non-tender	[X] Site without erythema, discharge, edema  [X] Epidural tubing and connection checked	[X] Gross neurological exam within normal limits  [ ] Catheter removed – tip intact		[X] Afebrile	            [ ] Febrile: ___                          9.5    16.6  )-----------( 306      ( 23 Nov 2017 10:26 )             27.4     Vital Signs Last 24 Hrs  T(C): 36.6 (11-23-17 @ 11:00), Max: 36.8 (11-23-17 @ 04:15)  T(F): 97.9 (11-23-17 @ 11:00), Max: 98.2 (11-23-17 @ 04:15)  HR: 77 (11-23-17 @ 09:50) (77 - 92)  BP: 101/59 (11-23-17 @ 09:50) (100/51 - 113/56)  BP(mean): 77 (11-23-17 @ 04:15) (73 - 81)  RR: 18 (11-23-17 @ 11:00) (17 - 22)  SpO2: 97% (11-23-17 @ 11:00) (97% - 100%)      Sedation Score:	[X] Alert	[ ] Drowsy	[ ] Arousable  [ ] Asleep     [ ] Unresponsive    Side Effects:	[X] None	[ ] Nausea	[ ] Vomiting   [ ] Pruritus  		[ ] Weakness     [ ] Numbness	[ ] Other:    ASSESSMENT/ PLAN:    Therapy:	[X] Continue   [ ] Discontinue   [ ] Change to PRN Analgesics   [ ] Change to PCA    Documentation and Verification of current medications:  [X] Done	[ ] Not done, not eligible, reason:

## 2017-11-24 DIAGNOSIS — H92.01 OTALGIA, RIGHT EAR: ICD-10-CM

## 2017-11-24 LAB
BASOPHILS # BLD AUTO: 0.1 K/UL — SIGNIFICANT CHANGE UP (ref 0–0.2)
BASOPHILS NFR BLD AUTO: 0.6 % — SIGNIFICANT CHANGE UP (ref 0–2)
EOSINOPHIL # BLD AUTO: 0.1 K/UL — SIGNIFICANT CHANGE UP (ref 0–0.5)
EOSINOPHIL NFR BLD AUTO: 1 % — SIGNIFICANT CHANGE UP (ref 0–6)
HCT VFR BLD CALC: 27.8 % — LOW (ref 34.5–45)
HGB BLD-MCNC: 9.4 G/DL — LOW (ref 11.5–15.5)
LYMPHOCYTES # BLD AUTO: 2.7 K/UL — SIGNIFICANT CHANGE UP (ref 1–3.3)
LYMPHOCYTES # BLD AUTO: 27.7 % — SIGNIFICANT CHANGE UP (ref 13–44)
MCHC RBC-ENTMCNC: 31.6 PG — SIGNIFICANT CHANGE UP (ref 27–34)
MCHC RBC-ENTMCNC: 33.8 GM/DL — SIGNIFICANT CHANGE UP (ref 32–36)
MCV RBC AUTO: 93.7 FL — SIGNIFICANT CHANGE UP (ref 80–100)
MONOCYTES # BLD AUTO: 0.8 K/UL — SIGNIFICANT CHANGE UP (ref 0–0.9)
MONOCYTES NFR BLD AUTO: 7.8 % — SIGNIFICANT CHANGE UP (ref 2–14)
NEUTROPHILS # BLD AUTO: 6.1 K/UL — SIGNIFICANT CHANGE UP (ref 1.8–7.4)
NEUTROPHILS NFR BLD AUTO: 62.8 % — SIGNIFICANT CHANGE UP (ref 43–77)
PLATELET # BLD AUTO: 303 K/UL — SIGNIFICANT CHANGE UP (ref 150–400)
RBC # BLD: 2.97 M/UL — LOW (ref 3.8–5.2)
RBC # FLD: 12.8 % — SIGNIFICANT CHANGE UP (ref 10.3–14.5)
WBC # BLD: 9.7 K/UL — SIGNIFICANT CHANGE UP (ref 3.8–10.5)
WBC # FLD AUTO: 9.7 K/UL — SIGNIFICANT CHANGE UP (ref 3.8–10.5)

## 2017-11-24 PROCEDURE — 99254 IP/OBS CNSLTJ NEW/EST MOD 60: CPT

## 2017-11-24 RX ORDER — DOCUSATE SODIUM 100 MG
100 CAPSULE ORAL
Qty: 0 | Refills: 0 | Status: DISCONTINUED | OUTPATIENT
Start: 2017-11-24 | End: 2017-11-27

## 2017-11-24 RX ORDER — FERROUS SULFATE 325(65) MG
325 TABLET ORAL
Qty: 0 | Refills: 0 | Status: DISCONTINUED | OUTPATIENT
Start: 2017-11-24 | End: 2017-11-27

## 2017-11-24 RX ORDER — ASCORBIC ACID 60 MG
250 TABLET,CHEWABLE ORAL
Qty: 0 | Refills: 0 | Status: DISCONTINUED | OUTPATIENT
Start: 2017-11-24 | End: 2017-11-27

## 2017-11-24 RX ADMIN — Medication 975 MILLIGRAM(S): at 17:01

## 2017-11-24 RX ADMIN — Medication 975 MILLIGRAM(S): at 07:21

## 2017-11-24 RX ADMIN — Medication 975 MILLIGRAM(S): at 18:01

## 2017-11-24 RX ADMIN — Medication 975 MILLIGRAM(S): at 11:42

## 2017-11-24 RX ADMIN — Medication 100 MILLIGRAM(S): at 17:01

## 2017-11-24 RX ADMIN — Medication 30 MILLIGRAM(S): at 14:59

## 2017-11-24 RX ADMIN — Medication 975 MILLIGRAM(S): at 00:24

## 2017-11-24 RX ADMIN — Medication 975 MILLIGRAM(S): at 06:31

## 2017-11-24 RX ADMIN — HEPARIN SODIUM 5000 UNIT(S): 5000 INJECTION INTRAVENOUS; SUBCUTANEOUS at 17:01

## 2017-11-24 RX ADMIN — Medication 30 MILLIGRAM(S): at 22:45

## 2017-11-24 RX ADMIN — Medication 30 MILLIGRAM(S): at 14:25

## 2017-11-24 RX ADMIN — Medication 975 MILLIGRAM(S): at 23:55

## 2017-11-24 RX ADMIN — Medication 30 MILLIGRAM(S): at 09:00

## 2017-11-24 RX ADMIN — Medication 30 MILLIGRAM(S): at 03:35

## 2017-11-24 RX ADMIN — Medication 1 TABLET(S): at 11:45

## 2017-11-24 RX ADMIN — ONDANSETRON 4 MILLIGRAM(S): 8 TABLET, FILM COATED ORAL at 18:24

## 2017-11-24 RX ADMIN — Medication 30 MILLIGRAM(S): at 08:19

## 2017-11-24 RX ADMIN — Medication 325 MILLIGRAM(S): at 17:02

## 2017-11-24 RX ADMIN — Medication 250 MILLIGRAM(S): at 17:02

## 2017-11-24 RX ADMIN — HEPARIN SODIUM 5000 UNIT(S): 5000 INJECTION INTRAVENOUS; SUBCUTANEOUS at 06:31

## 2017-11-24 RX ADMIN — Medication 975 MILLIGRAM(S): at 01:00

## 2017-11-24 RX ADMIN — SODIUM CHLORIDE 125 MILLILITER(S): 9 INJECTION, SOLUTION INTRAVENOUS at 12:38

## 2017-11-24 RX ADMIN — Medication 30 MILLIGRAM(S): at 04:00

## 2017-11-24 RX ADMIN — Medication 30 MILLIGRAM(S): at 21:43

## 2017-11-24 RX ADMIN — Medication 975 MILLIGRAM(S): at 12:16

## 2017-11-24 NOTE — CONSULT NOTE ADULT - SUBJECTIVE AND OBJECTIVE BOX
CC: right ear pain    HPI: Patient is a 37y old  Female who presents with a chief complaint of labor s/p c/s yesterday now complaining of right ear pain. Pt denies any tinnitus, dizzines, fevers/chills, discharge from her ear, or hearing loss.     PAST MEDICAL & SURGICAL HISTORY:    Allergies    No Known Allergies    Intolerances    eggs (Vomiting)    MEDICATIONS  (STANDING):  acetaminophen   Tablet. 975 milliGRAM(s) Oral every 6 hours  ascorbic acid 250 milliGRAM(s) Oral two times a day  docusate sodium 100 milliGRAM(s) Oral two times a day  famotidine Injectable 20 milliGRAM(s) IV Push once  fentaNYL (3 MICROgram(s)/mL) + BUpivacaine 0.01% in 0.9% Sodium Chloride PCEA 250 milliLiter(s) Epidural PCA Continuous  ferrous    sulfate 325 milliGRAM(s) Oral two times a day with meals  folic acid 1 milliGRAM(s) Oral daily  heparin  Injectable 5000 Unit(s) SubCutaneous every 12 hours  ibuprofen  Tablet 600 milliGRAM(s) Oral every 6 hours  ketorolac   Injectable 30 milliGRAM(s) IV Push every 6 hours  lactated ringers Bolus 500 milliLiter(s) IV Bolus once  lactated ringers Bolus 1000 milliLiter(s) IV Bolus once  lactated ringers. 1000 milliLiter(s) (125 mL/Hr) IV Continuous <Continuous>  lactated ringers. 1000 milliLiter(s) (125 mL/Hr) IV Continuous <Continuous>  oxyCODONE    IR 5 milliGRAM(s) Oral every 3 hours  oxytocin Infusion 333.333 milliUNIT(s)/Min (1000 mL/Hr) IV Continuous <Continuous>  prenatal multivitamin 1 Tablet(s) Oral daily  prenatal multivitamin 1 Tablet(s) Oral daily    MEDICATIONS  (PRN):  ALBUTerol    90 MICROgram(s) HFA Inhaler 2 Puff(s) Inhalation every 6 hours PRN Shortness of Breath and/or Wheezing  dexamethasone  Injectable 4 milliGRAM(s) IV Push every 6 hours PRN Nausea, IF ondansetron is ineffective after 30 - 60 minutes  diphenhydrAMINE   Capsule 25 milliGRAM(s) Oral every 6 hours PRN Itching  fentaNYL (3 MICROgram(s)/mL) + BUpivacaine 0.01% in 0.9% Sodium Chloride PCEA Rescue Clinician Bolus 5 milliLiter(s) Epidural every 15 minutes PRN Moderate Pain (4 - 6)  glycerin Suppository - Adult 1 Suppository(s) Rectal at bedtime PRN Constipation  lanolin Ointment 1 Application(s) Topical every 3 hours PRN Sore Nipples  metoclopramide Injectable 10 milliGRAM(s) IV Push once PRN Nausea and/or Vomiting  naloxone Injectable 0.1 milliGRAM(s) IV Push every 3 minutes PRN For ANY of the following changes in patient status:  A. RR LESS THAN 10 breaths per minute, B. Oxygen saturation LESS THAN 90%, C. Sedation score of 6  ondansetron Injectable 4 milliGRAM(s) IV Push every 6 hours PRN Nausea  oxyCODONE    IR 5 milliGRAM(s) Oral every 4 hours PRN Severe Pain (7 - 10)  simethicone 80 milliGRAM(s) Chew every 4 hours PRN Gas      Social History: no tobacco, no etoh, no idu    ROS: ENT- right ear pain, GI, , CV, Pulm, Neuro, Psych, MS, Heme, Endo, Constitutional; all negative except as noted in HPI    Vital Signs Last 24 Hrs  T(C): 36.8 (24 Nov 2017 08:39), Max: 36.8 (24 Nov 2017 01:00)  T(F): 98.2 (24 Nov 2017 08:39), Max: 98.2 (24 Nov 2017 01:00)  HR: 92 (24 Nov 2017 08:39) (79 - 92)  BP: 104/69 (24 Nov 2017 09:59) (96/62 - 105/67)  BP(mean): --  RR: 18 (24 Nov 2017 08:39) (18 - 18)  SpO2: 99% (24 Nov 2017 08:39) (97% - 99%)                          9.4    9.7   )-----------( 303      ( 24 Nov 2017 07:11 )             27.8             PHYSICAL EXAM:  Gen: NAD, well-developed  Head: Normocephalic, Atraumatic  Face: no edema/erythema/fluctuance, parotid glands soft without mass  Eyes: no scleral injection  Ears: Right - ear canal clear, TM intact without effusion / erythema.  No evidence of any fluid drainage.  No Mastoid tenderness / erythema/ ear bulging            Left - ear canal clear, TM intact without effusion / erythema.  No evidence of any fluid drainage.  No Mastoid tenderness / erythema/ ear bulging  Nose: Nares bilaterally patent, no discharge  Mouth: No Stridor / Drooling / Trismus.  Mucosa moist, tongue/uvula midline, oropharynx clear  Neck: Flat, supple, no lymphadenopathy, trachea midline, no masses  Resp: breathing easily, no stridor  CV: no peripheral edema/cyanosis    Fiberoptic Indirect laryngoscopy:  (With Scope #2) CC: right ear pain and echoing	    HPI: Patient is a 37y old  Female who presents with a chief complaint of labor s/p scheduled c/s yesterday now complaining of right ear pain and echoing. Pt states pain is 7/10 on pain scale, increases in intensity upon palpation or manipulation. Pain is associated with an echoing, making herself sound louder than she is. Pt also states she has had a few episodes of dizziness, in which she feels the room is spinning. The first episode was after she stood up and started walking for the first time, and the second was while she was breastfeeding. She looked down, closed her eyes and became dizzy. Pt denies any tinnitus, fevers/chills, discharge from her ear, or hearing loss.     PAST MEDICAL & SURGICAL HISTORY:    Allergies    No Known Allergies    Intolerances    eggs (Vomiting)    MEDICATIONS  (STANDING):  acetaminophen   Tablet. 975 milliGRAM(s) Oral every 6 hours  ascorbic acid 250 milliGRAM(s) Oral two times a day  docusate sodium 100 milliGRAM(s) Oral two times a day  famotidine Injectable 20 milliGRAM(s) IV Push once  fentaNYL (3 MICROgram(s)/mL) + BUpivacaine 0.01% in 0.9% Sodium Chloride PCEA 250 milliLiter(s) Epidural PCA Continuous  ferrous    sulfate 325 milliGRAM(s) Oral two times a day with meals  folic acid 1 milliGRAM(s) Oral daily  heparin  Injectable 5000 Unit(s) SubCutaneous every 12 hours  ibuprofen  Tablet 600 milliGRAM(s) Oral every 6 hours  ketorolac   Injectable 30 milliGRAM(s) IV Push every 6 hours  lactated ringers Bolus 500 milliLiter(s) IV Bolus once  lactated ringers Bolus 1000 milliLiter(s) IV Bolus once  lactated ringers. 1000 milliLiter(s) (125 mL/Hr) IV Continuous <Continuous>  lactated ringers. 1000 milliLiter(s) (125 mL/Hr) IV Continuous <Continuous>  oxyCODONE    IR 5 milliGRAM(s) Oral every 3 hours  oxytocin Infusion 333.333 milliUNIT(s)/Min (1000 mL/Hr) IV Continuous <Continuous>  prenatal multivitamin 1 Tablet(s) Oral daily  prenatal multivitamin 1 Tablet(s) Oral daily    MEDICATIONS  (PRN):  ALBUTerol    90 MICROgram(s) HFA Inhaler 2 Puff(s) Inhalation every 6 hours PRN Shortness of Breath and/or Wheezing  dexamethasone  Injectable 4 milliGRAM(s) IV Push every 6 hours PRN Nausea, IF ondansetron is ineffective after 30 - 60 minutes  diphenhydrAMINE   Capsule 25 milliGRAM(s) Oral every 6 hours PRN Itching  fentaNYL (3 MICROgram(s)/mL) + BUpivacaine 0.01% in 0.9% Sodium Chloride PCEA Rescue Clinician Bolus 5 milliLiter(s) Epidural every 15 minutes PRN Moderate Pain (4 - 6)  glycerin Suppository - Adult 1 Suppository(s) Rectal at bedtime PRN Constipation  lanolin Ointment 1 Application(s) Topical every 3 hours PRN Sore Nipples  metoclopramide Injectable 10 milliGRAM(s) IV Push once PRN Nausea and/or Vomiting  naloxone Injectable 0.1 milliGRAM(s) IV Push every 3 minutes PRN For ANY of the following changes in patient status:  A. RR LESS THAN 10 breaths per minute, B. Oxygen saturation LESS THAN 90%, C. Sedation score of 6  ondansetron Injectable 4 milliGRAM(s) IV Push every 6 hours PRN Nausea  oxyCODONE    IR 5 milliGRAM(s) Oral every 4 hours PRN Severe Pain (7 - 10)  simethicone 80 milliGRAM(s) Chew every 4 hours PRN Gas      Social History: no tobacco, no etoh, no idu    ROS: ENT- right ear pain, GI, , CV, Pulm, Neuro, Psych, MS, Heme, Endo, Constitutional; all negative except as noted in HPI    Vital Signs Last 24 Hrs  T(C): 36.8 (24 Nov 2017 08:39), Max: 36.8 (24 Nov 2017 01:00)  T(F): 98.2 (24 Nov 2017 08:39), Max: 98.2 (24 Nov 2017 01:00)  HR: 92 (24 Nov 2017 08:39) (79 - 92)  BP: 104/69 (24 Nov 2017 09:59) (96/62 - 105/67)  BP(mean): --  RR: 18 (24 Nov 2017 08:39) (18 - 18)  SpO2: 99% (24 Nov 2017 08:39) (97% - 99%)                          9.4    9.7   )-----------( 303      ( 24 Nov 2017 07:11 )             27.8             PHYSICAL EXAM:  Gen: NAD, well-developed  Head: Normocephalic, Atraumatic  Face: no edema/erythema/fluctuance, parotid glands soft without mass  Eyes: no scleral injection  Ears: Right - + tragus TTP and tender upon minor pinna manipulations, ear canal clear, TM intact without effusion, mild erythema posteriorly.  No evidence of any fluid drainage.  No Mastoid tenderness / erythema/ ear bulging            Left - ear canal clear, TM intact without effusion / erythema.  No evidence of any fluid drainage.  No Mastoid tenderness / erythema/ ear bulging  Nose: Nares bilaterally patent, no discharge  Mouth: No Stridor / Drooling / Trismus.  Mucosa moist, tongue/uvula midline, oropharynx clear  Neck: Flat, supple, no lymphadenopathy, trachea midline, no masses  Resp: breathing easily, no stridor  CV: no peripheral edema/cyanosis    Fiberoptic Indirect laryngoscopy:  (With Scope #2) CC: right ear pain and echoing	    HPI: Patient is a 37y old  Female who presents with a chief complaint of labor s/p scheduled c/s yesterday now complaining of right ear pain and echoing. Pt states pain is 7/10 on pain scale, increases in intensity upon palpation or manipulation. Pain is associated with an echoing, making herself sound louder than she is like "on airplane."  Pt denies any tinnitus, dizziness fevers/chills, discharge from her ear, or hearing loss.     PAST MEDICAL & SURGICAL HISTORY:    Allergies    No Known Allergies    Intolerances    eggs (Vomiting)    MEDICATIONS  (STANDING):  acetaminophen   Tablet. 975 milliGRAM(s) Oral every 6 hours  ascorbic acid 250 milliGRAM(s) Oral two times a day  docusate sodium 100 milliGRAM(s) Oral two times a day  famotidine Injectable 20 milliGRAM(s) IV Push once  fentaNYL (3 MICROgram(s)/mL) + BUpivacaine 0.01% in 0.9% Sodium Chloride PCEA 250 milliLiter(s) Epidural PCA Continuous  ferrous    sulfate 325 milliGRAM(s) Oral two times a day with meals  folic acid 1 milliGRAM(s) Oral daily  heparin  Injectable 5000 Unit(s) SubCutaneous every 12 hours  ibuprofen  Tablet 600 milliGRAM(s) Oral every 6 hours  ketorolac   Injectable 30 milliGRAM(s) IV Push every 6 hours  lactated ringers Bolus 500 milliLiter(s) IV Bolus once  lactated ringers Bolus 1000 milliLiter(s) IV Bolus once  lactated ringers. 1000 milliLiter(s) (125 mL/Hr) IV Continuous <Continuous>  lactated ringers. 1000 milliLiter(s) (125 mL/Hr) IV Continuous <Continuous>  oxyCODONE    IR 5 milliGRAM(s) Oral every 3 hours  oxytocin Infusion 333.333 milliUNIT(s)/Min (1000 mL/Hr) IV Continuous <Continuous>  prenatal multivitamin 1 Tablet(s) Oral daily  prenatal multivitamin 1 Tablet(s) Oral daily    MEDICATIONS  (PRN):  ALBUTerol    90 MICROgram(s) HFA Inhaler 2 Puff(s) Inhalation every 6 hours PRN Shortness of Breath and/or Wheezing  dexamethasone  Injectable 4 milliGRAM(s) IV Push every 6 hours PRN Nausea, IF ondansetron is ineffective after 30 - 60 minutes  diphenhydrAMINE   Capsule 25 milliGRAM(s) Oral every 6 hours PRN Itching  fentaNYL (3 MICROgram(s)/mL) + BUpivacaine 0.01% in 0.9% Sodium Chloride PCEA Rescue Clinician Bolus 5 milliLiter(s) Epidural every 15 minutes PRN Moderate Pain (4 - 6)  glycerin Suppository - Adult 1 Suppository(s) Rectal at bedtime PRN Constipation  lanolin Ointment 1 Application(s) Topical every 3 hours PRN Sore Nipples  metoclopramide Injectable 10 milliGRAM(s) IV Push once PRN Nausea and/or Vomiting  naloxone Injectable 0.1 milliGRAM(s) IV Push every 3 minutes PRN For ANY of the following changes in patient status:  A. RR LESS THAN 10 breaths per minute, B. Oxygen saturation LESS THAN 90%, C. Sedation score of 6  ondansetron Injectable 4 milliGRAM(s) IV Push every 6 hours PRN Nausea  oxyCODONE    IR 5 milliGRAM(s) Oral every 4 hours PRN Severe Pain (7 - 10)  simethicone 80 milliGRAM(s) Chew every 4 hours PRN Gas      Social History: no tobacco, no etoh, no idu    ROS: ENT- right ear pain, GI, , CV, Pulm, Neuro, Psych, MS, Heme, Endo, Constitutional; all negative except as noted in HPI    Vital Signs Last 24 Hrs  T(C): 36.8 (24 Nov 2017 08:39), Max: 36.8 (24 Nov 2017 01:00)  T(F): 98.2 (24 Nov 2017 08:39), Max: 98.2 (24 Nov 2017 01:00)  HR: 92 (24 Nov 2017 08:39) (79 - 92)  BP: 104/69 (24 Nov 2017 09:59) (96/62 - 105/67)  BP(mean): --  RR: 18 (24 Nov 2017 08:39) (18 - 18)  SpO2: 99% (24 Nov 2017 08:39) (97% - 99%)                          9.4    9.7   )-----------( 303      ( 24 Nov 2017 07:11 )             27.8             PHYSICAL EXAM:  Gen: NAD, well-developed  Head: Normocephalic, Atraumatic  Face: no edema/erythema/fluctuance, parotid glands soft without mass  Eyes: no scleral injection  Ears: Right - slight tragus TTP and tender upon minor pinna manipulations, ear canal clear, TM intact without effusion/ erythema.  No evidence of any fluid drainage.  No Mastoid tenderness / erythema/ ear bulging            Left - ear canal clear, TM intact without effusion / erythema.  No evidence of any fluid drainage.  No Mastoid tenderness / erythema/ ear bulging  Nose: Nares bilaterally patent, no discharge  Mouth: No Stridor / Drooling / Trismus.  Mucosa moist, tongue/uvula midline, oropharynx clear, no TMJ pain or clicking   Neck: Flat, supple, no lymphadenopathy, trachea midline, no masses  Resp: breathing easily, no stridor  CV: no peripheral edema/cyanosis

## 2017-11-24 NOTE — LACTATION INITIAL EVALUATION - LACTATION INTERVENTIONS
initiate hand expression routine/initiate dual electric pump routine/encouraged Skin-to-skin when infant condition permits

## 2017-11-24 NOTE — CHART NOTE - NSCHARTNOTEFT_GEN_A_CORE
YAMILA STEARNS Postpartum    POD#1      Vital Signs Last 24 Hrs  T(C): 36.8 (2017 08:39), Max: 36.8 (2017 01:00)  T(F): 98.2 (2017 08:39), Max: 98.2 (2017 01:00)  HR: 92 (2017 08:39) (79 - 92)  BP: 104/69 (2017 09:59) (96/62 - 105/67)  RR: 18 (2017 08:39) (18 - 18)  SpO2: 99% (2017 08:39) (97% - 99%)                          9.4    9.7   )-----------( 303      ( 2017 07:11 )             27.8   baso 0.6    eos 1.0    imm gran x      lymph 27.7   mono 7.8    poly 62.8                         9.5    16.6  )-----------( 306      ( 2017 10:26 )             27.4   baso x      eos x      imm gran x      lymph x      mono x      poly x            Plan:  - Ferrous Sulfate, Colace, Vitamin C supplementation.  - Repeat CBC POD#3.  - Monitor for signs/symptoms of anemia.

## 2017-11-24 NOTE — CONSULT NOTE ADULT - ASSESSMENT
37y old  Female complaining of right ear pain. 37y old  Female complaining of right ear pain and dizziness s/p c/s yesterday. 37y old  Female complaining of right ear pain s/p c/s yesterday, which is relieved with meds

## 2017-11-25 RX ORDER — OXYCODONE HYDROCHLORIDE 5 MG/1
5 TABLET ORAL EVERY 4 HOURS
Qty: 0 | Refills: 0 | Status: DISCONTINUED | OUTPATIENT
Start: 2017-11-25 | End: 2017-11-27

## 2017-11-25 RX ORDER — OXYCODONE HYDROCHLORIDE 5 MG/1
5 TABLET ORAL
Qty: 0 | Refills: 0 | Status: DISCONTINUED | OUTPATIENT
Start: 2017-11-25 | End: 2017-11-27

## 2017-11-25 RX ORDER — IBUPROFEN 200 MG
600 TABLET ORAL EVERY 6 HOURS
Qty: 0 | Refills: 0 | Status: DISCONTINUED | OUTPATIENT
Start: 2017-11-25 | End: 2017-11-26

## 2017-11-25 RX ORDER — ONDANSETRON 8 MG/1
4 TABLET, FILM COATED ORAL EVERY 6 HOURS
Qty: 0 | Refills: 0 | Status: DISCONTINUED | OUTPATIENT
Start: 2017-11-25 | End: 2017-11-27

## 2017-11-25 RX ORDER — ONDANSETRON 8 MG/1
4 TABLET, FILM COATED ORAL ONCE
Qty: 0 | Refills: 0 | Status: COMPLETED | OUTPATIENT
Start: 2017-11-25 | End: 2017-11-25

## 2017-11-25 RX ADMIN — ONDANSETRON 4 MILLIGRAM(S): 8 TABLET, FILM COATED ORAL at 23:47

## 2017-11-25 RX ADMIN — HEPARIN SODIUM 5000 UNIT(S): 5000 INJECTION INTRAVENOUS; SUBCUTANEOUS at 05:31

## 2017-11-25 RX ADMIN — ONDANSETRON 4 MILLIGRAM(S): 8 TABLET, FILM COATED ORAL at 14:27

## 2017-11-25 RX ADMIN — Medication 975 MILLIGRAM(S): at 23:21

## 2017-11-25 RX ADMIN — OXYCODONE HYDROCHLORIDE 5 MILLIGRAM(S): 5 TABLET ORAL at 11:40

## 2017-11-25 RX ADMIN — Medication 975 MILLIGRAM(S): at 11:40

## 2017-11-25 RX ADMIN — Medication 600 MILLIGRAM(S): at 19:37

## 2017-11-25 RX ADMIN — Medication 600 MILLIGRAM(S): at 15:30

## 2017-11-25 RX ADMIN — Medication 975 MILLIGRAM(S): at 00:00

## 2017-11-25 RX ADMIN — OXYCODONE HYDROCHLORIDE 5 MILLIGRAM(S): 5 TABLET ORAL at 23:25

## 2017-11-25 RX ADMIN — HEPARIN SODIUM 5000 UNIT(S): 5000 INJECTION INTRAVENOUS; SUBCUTANEOUS at 17:25

## 2017-11-25 RX ADMIN — Medication 600 MILLIGRAM(S): at 08:22

## 2017-11-25 RX ADMIN — Medication 600 MILLIGRAM(S): at 09:15

## 2017-11-25 RX ADMIN — Medication 600 MILLIGRAM(S): at 20:05

## 2017-11-25 RX ADMIN — OXYCODONE HYDROCHLORIDE 5 MILLIGRAM(S): 5 TABLET ORAL at 16:21

## 2017-11-25 RX ADMIN — Medication 975 MILLIGRAM(S): at 17:24

## 2017-11-25 RX ADMIN — Medication 100 MILLIGRAM(S): at 05:30

## 2017-11-25 RX ADMIN — Medication 975 MILLIGRAM(S): at 10:41

## 2017-11-25 RX ADMIN — Medication 100 MILLIGRAM(S): at 17:24

## 2017-11-25 RX ADMIN — OXYCODONE HYDROCHLORIDE 5 MILLIGRAM(S): 5 TABLET ORAL at 10:41

## 2017-11-25 RX ADMIN — Medication 975 MILLIGRAM(S): at 06:26

## 2017-11-25 RX ADMIN — OXYCODONE HYDROCHLORIDE 5 MILLIGRAM(S): 5 TABLET ORAL at 15:48

## 2017-11-25 RX ADMIN — Medication 250 MILLIGRAM(S): at 05:30

## 2017-11-25 RX ADMIN — Medication 600 MILLIGRAM(S): at 14:14

## 2017-11-25 RX ADMIN — Medication 975 MILLIGRAM(S): at 05:30

## 2017-11-25 RX ADMIN — Medication 975 MILLIGRAM(S): at 18:28

## 2017-11-25 NOTE — PROVIDER CONTACT NOTE (OTHER) - SITUATION
Patient refused iron because she thought it made her nauseous. Patient also taking own Vitamin C, folic acid, prenatal.

## 2017-11-26 ENCOUNTER — TRANSCRIPTION ENCOUNTER (OUTPATIENT)
Age: 37
End: 2017-11-26

## 2017-11-26 LAB
ALBUMIN SERPL ELPH-MCNC: 3.3 G/DL — SIGNIFICANT CHANGE UP (ref 3.3–5)
ALP SERPL-CCNC: 78 U/L — SIGNIFICANT CHANGE UP (ref 40–120)
ALT FLD-CCNC: 36 U/L RC — SIGNIFICANT CHANGE UP (ref 10–45)
ANION GAP SERPL CALC-SCNC: 10 MMOL/L — SIGNIFICANT CHANGE UP (ref 5–17)
AST SERPL-CCNC: 44 U/L — HIGH (ref 10–40)
BILIRUB SERPL-MCNC: 0.1 MG/DL — LOW (ref 0.2–1.2)
BUN SERPL-MCNC: 8 MG/DL — SIGNIFICANT CHANGE UP (ref 7–23)
CALCIUM SERPL-MCNC: 8.8 MG/DL — SIGNIFICANT CHANGE UP (ref 8.4–10.5)
CHLORIDE SERPL-SCNC: 101 MMOL/L — SIGNIFICANT CHANGE UP (ref 96–108)
CO2 SERPL-SCNC: 26 MMOL/L — SIGNIFICANT CHANGE UP (ref 22–31)
CREAT SERPL-MCNC: 0.51 MG/DL — SIGNIFICANT CHANGE UP (ref 0.5–1.3)
GLUCOSE SERPL-MCNC: 96 MG/DL — SIGNIFICANT CHANGE UP (ref 70–99)
HCT VFR BLD CALC: 30.1 % — LOW (ref 34.5–45)
HGB BLD-MCNC: 9.9 G/DL — LOW (ref 11.5–15.5)
MCHC RBC-ENTMCNC: 31.1 PG — SIGNIFICANT CHANGE UP (ref 27–34)
MCHC RBC-ENTMCNC: 33 GM/DL — SIGNIFICANT CHANGE UP (ref 32–36)
MCV RBC AUTO: 94.4 FL — SIGNIFICANT CHANGE UP (ref 80–100)
PLATELET # BLD AUTO: 373 K/UL — SIGNIFICANT CHANGE UP (ref 150–400)
POTASSIUM SERPL-MCNC: 4.3 MMOL/L — SIGNIFICANT CHANGE UP (ref 3.5–5.3)
POTASSIUM SERPL-SCNC: 4.3 MMOL/L — SIGNIFICANT CHANGE UP (ref 3.5–5.3)
PROT SERPL-MCNC: 6.1 G/DL — SIGNIFICANT CHANGE UP (ref 6–8.3)
RBC # BLD: 3.19 M/UL — LOW (ref 3.8–5.2)
RBC # FLD: 13 % — SIGNIFICANT CHANGE UP (ref 10.3–14.5)
SODIUM SERPL-SCNC: 137 MMOL/L — SIGNIFICANT CHANGE UP (ref 135–145)
WBC # BLD: 9.9 K/UL — SIGNIFICANT CHANGE UP (ref 3.8–10.5)
WBC # FLD AUTO: 9.9 K/UL — SIGNIFICANT CHANGE UP (ref 3.8–10.5)

## 2017-11-26 PROCEDURE — 93010 ELECTROCARDIOGRAM REPORT: CPT

## 2017-11-26 RX ORDER — ACETAMINOPHEN 500 MG
975 TABLET ORAL EVERY 6 HOURS
Qty: 0 | Refills: 0 | Status: DISCONTINUED | OUTPATIENT
Start: 2017-11-26 | End: 2017-11-27

## 2017-11-26 RX ORDER — OXYCODONE HYDROCHLORIDE 5 MG/1
1 TABLET ORAL
Qty: 30 | Refills: 0 | OUTPATIENT
Start: 2017-11-26

## 2017-11-26 RX ORDER — IBUPROFEN 200 MG
1 TABLET ORAL
Qty: 0 | Refills: 0 | COMMUNITY
Start: 2017-11-26

## 2017-11-26 RX ORDER — FOLIC ACID 0.8 MG
1 TABLET ORAL
Qty: 0 | Refills: 0 | COMMUNITY
Start: 2017-11-26

## 2017-11-26 RX ORDER — ACETAMINOPHEN 500 MG
3 TABLET ORAL
Qty: 0 | Refills: 0 | COMMUNITY
Start: 2017-11-26

## 2017-11-26 RX ORDER — ASCORBIC ACID 60 MG
1 TABLET,CHEWABLE ORAL
Qty: 0 | Refills: 0 | COMMUNITY
Start: 2017-11-26

## 2017-11-26 RX ORDER — POLYETHYLENE GLYCOL 3350 17 G/17G
17 POWDER, FOR SOLUTION ORAL DAILY
Qty: 0 | Refills: 0 | Status: DISCONTINUED | OUTPATIENT
Start: 2017-11-26 | End: 2017-11-27

## 2017-11-26 RX ORDER — IBUPROFEN 200 MG
800 TABLET ORAL EVERY 8 HOURS
Qty: 0 | Refills: 0 | Status: DISCONTINUED | OUTPATIENT
Start: 2017-11-26 | End: 2017-11-27

## 2017-11-26 RX ORDER — DOCUSATE SODIUM 100 MG
1 CAPSULE ORAL
Qty: 0 | Refills: 0 | COMMUNITY
Start: 2017-11-26

## 2017-11-26 RX ORDER — ALBUTEROL 90 UG/1
2 AEROSOL, METERED ORAL
Qty: 0 | Refills: 0 | COMMUNITY
Start: 2017-11-26

## 2017-11-26 RX ADMIN — ONDANSETRON 4 MILLIGRAM(S): 8 TABLET, FILM COATED ORAL at 23:50

## 2017-11-26 RX ADMIN — OXYCODONE HYDROCHLORIDE 5 MILLIGRAM(S): 5 TABLET ORAL at 23:50

## 2017-11-26 RX ADMIN — OXYCODONE HYDROCHLORIDE 5 MILLIGRAM(S): 5 TABLET ORAL at 17:32

## 2017-11-26 RX ADMIN — Medication 975 MILLIGRAM(S): at 19:48

## 2017-11-26 RX ADMIN — ONDANSETRON 4 MILLIGRAM(S): 8 TABLET, FILM COATED ORAL at 17:02

## 2017-11-26 RX ADMIN — Medication 800 MILLIGRAM(S): at 19:21

## 2017-11-26 RX ADMIN — Medication 800 MILLIGRAM(S): at 10:46

## 2017-11-26 RX ADMIN — Medication 975 MILLIGRAM(S): at 14:07

## 2017-11-26 RX ADMIN — Medication 600 MILLIGRAM(S): at 04:46

## 2017-11-26 RX ADMIN — Medication 975 MILLIGRAM(S): at 05:36

## 2017-11-26 RX ADMIN — Medication 975 MILLIGRAM(S): at 00:05

## 2017-11-26 RX ADMIN — Medication 800 MILLIGRAM(S): at 18:54

## 2017-11-26 RX ADMIN — Medication 100 MILLIGRAM(S): at 05:36

## 2017-11-26 RX ADMIN — HEPARIN SODIUM 5000 UNIT(S): 5000 INJECTION INTRAVENOUS; SUBCUTANEOUS at 18:54

## 2017-11-26 RX ADMIN — OXYCODONE HYDROCHLORIDE 5 MILLIGRAM(S): 5 TABLET ORAL at 11:16

## 2017-11-26 RX ADMIN — Medication 600 MILLIGRAM(S): at 05:39

## 2017-11-26 RX ADMIN — Medication 800 MILLIGRAM(S): at 11:16

## 2017-11-26 RX ADMIN — HEPARIN SODIUM 5000 UNIT(S): 5000 INJECTION INTRAVENOUS; SUBCUTANEOUS at 05:37

## 2017-11-26 RX ADMIN — ONDANSETRON 4 MILLIGRAM(S): 8 TABLET, FILM COATED ORAL at 10:47

## 2017-11-26 RX ADMIN — Medication 1 TABLET(S): at 10:49

## 2017-11-26 RX ADMIN — OXYCODONE HYDROCHLORIDE 5 MILLIGRAM(S): 5 TABLET ORAL at 00:05

## 2017-11-26 RX ADMIN — OXYCODONE HYDROCHLORIDE 5 MILLIGRAM(S): 5 TABLET ORAL at 17:02

## 2017-11-26 RX ADMIN — Medication 975 MILLIGRAM(S): at 13:37

## 2017-11-26 RX ADMIN — OXYCODONE HYDROCHLORIDE 5 MILLIGRAM(S): 5 TABLET ORAL at 10:46

## 2017-11-26 NOTE — CONSULT NOTE ADULT - SUBJECTIVE AND OBJECTIVE BOX
CHIEF COMPLAINT: Dizziness    HISTORY OF PRESENT ILLNESS: 38 y/o female post-partum , POD #3 who has no significant past medical history other than mild intermittent asthma (infrequent PRN use of inhalers) and had pregnancy complicated by vasa previa, monitored on modified bedrest in hospital since . She delivered the baby early on . Since surgery, she has been complaining of intermittent dizziness associated with her incisional pain, particularly with movement/position changes. She does not describe it clearly with change from lying-to-sit or sit-to-stand. It can be with any movement that causes her pain. She also mentions a pain on the right side of her face near right ear, seen by ENT. This is not thought to be vertigo. No intervention by ENT.    An EKG was performed and was not normal. I was asked to review EKG and evaluate.    She denies chest pain or any increase in her usual pattern of intermittent need for asthma inhaler. States she only used it once since delivery Normally at home will use it approx 2x/week.    At present she reports some discomfort (incisional) but reports no dizziness.    PAST MEDICAL & SURGICAL HISTORY:  Mild intermittent asthma        MEDICATIONS:  heparin  Injectable 5000 Unit(s) SubCutaneous every 12 hours      ALBUTerol    90 MICROgram(s) HFA Inhaler 2 Puff(s) Inhalation every 6 hours PRN    acetaminophen   Tablet. 975 milliGRAM(s) Oral every 6 hours PRN  diphenhydrAMINE   Capsule 25 milliGRAM(s) Oral every 6 hours PRN  ibuprofen  Tablet 800 milliGRAM(s) Oral every 8 hours  metoclopramide Injectable 10 milliGRAM(s) IV Push once PRN  ondansetron    Tablet 4 milliGRAM(s) Oral every 6 hours PRN  oxyCODONE    IR 5 milliGRAM(s) Oral every 3 hours  oxyCODONE    IR 5 milliGRAM(s) Oral every 4 hours PRN    docusate sodium 100 milliGRAM(s) Oral two times a day  famotidine Injectable 20 milliGRAM(s) IV Push once  glycerin Suppository - Adult 1 Suppository(s) Rectal at bedtime PRN  simethicone 80 milliGRAM(s) Chew every 4 hours PRN      ascorbic acid 250 milliGRAM(s) Oral two times a day  ferrous    sulfate 325 milliGRAM(s) Oral two times a day with meals  folic acid 1 milliGRAM(s) Oral daily  lactated ringers Bolus 500 milliLiter(s) IV Bolus once  lactated ringers Bolus 1000 milliLiter(s) IV Bolus once  lactated ringers. 1000 milliLiter(s) IV Continuous <Continuous>  lactated ringers. 1000 milliLiter(s) IV Continuous <Continuous>  lanolin Ointment 1 Application(s) Topical every 3 hours PRN  oxytocin Infusion 333.333 milliUNIT(s)/Min IV Continuous <Continuous>  prenatal multivitamin 1 Tablet(s) Oral daily  prenatal multivitamin 1 Tablet(s) Oral daily      FAMILY HISTORY: N/C      SOCIAL HISTORY:    [ x ] Non-smoker  [ ] Smoker  [ ] Alcohol    Allergies    No Known Allergies    Intolerances    eggs (Vomiting)  	    REVIEW OF SYSTEMS:  CONSTITUTIONAL: No fever, weight loss, or fatigue  EYES: No eye pain, visual disturbances, or discharge  ENMT:  No difficulty hearing, tinnitus, vertigo; No sinus or throat pain  NECK: No pain or stiffness  RESPIRATORY: No cough, wheezing, chills or hemoptysis; No Shortness of Breath  CARDIOVASCULAR: No chest pain, palpitations, passing out, or leg swelling. + dizziness as per HPI  GASTROINTESTINAL: No abdominal or epigastric pain. No nausea, vomiting, or hematemesis; No diarrhea or constipation. No melena or hematochezia.    [ x ] All others negative	  [ ] Unable to obtain    PHYSICAL EXAM:  T(F): 97.7 (17 @ 13:00), Max: 98.1 (17 @ 09:00)  HR: 80 (17 @ :00) (71 - 92)  BP: 107/76 (17 @ 13:00) (94/60 - 112/75)  RR: 18 (17 @ 13:00) (18 - 18)  SpO2: 100% (17 @ 09:00) (98% - 100%)  I&O's Summary      Appearance: Normal, NAD	  HEENT:   Normal oral mucosa, PERRL, EOMI	  Cardiovascular: Normal S1 S2, No JVD, I/VI systolic flow murmur heard only at the RUSB, No edema  Respiratory: Lungs clear to auscultation	  Psychiatry: A & O x 3, Mood & affect appropriate  Gastrointestinal:  Soft, Non-tender, + BS, mildly distended (post-partum)  Skin: No rashes, No ecchymoses, No cyanosis	  Neurologic: Non-focal  Extremities: Normal range of motion, No clubbing, cyanosis or edema  Vascular: Peripheral pulses palpable 2+ bilaterally    	    ECG:  NSR, non-specific T-wave flattening, otherwise normal EKG	    RADIOLOGY:  OTHER: 	  	  LABS:	 	                              9.9    9.9   )-----------( 373      ( 2017 07:43 )             30.1         137  |  101  |  8   ----------------------------<  96  4.3   |  26  |  0.51    Ca    8.8      2017 09:52    TPro  6.1  /  Alb  3.3  /  TBili  0.1<L>  /  DBili  x   /  AST  44<H>  /  ALT  36  /  AlkPhos  78

## 2017-11-26 NOTE — DISCHARGE NOTE OB - CARE PROVIDER_API CALL
Marla Aparicio (MD), Obstetrics and Gynecology  [address] [place] [address]  Phone: [phone]  Fax: [phone]

## 2017-11-26 NOTE — CONSULT NOTE ADULT - ASSESSMENT
38 y/o female POD #3  delivery early for vasa previa. Since delivery, symptoms of intermittent dizzy/light-headedness without LOC, self-limited episodes.    EKG noted and, although T-wave abnormality is noted, may be within normal limits for her. No prior EKG available for comparison.  A minimal systolic murmur detected on exam at the RUSB may be evaluated outpt with an echocardiogram. But I do not suspect anysignificant structural heart abnormality that explains her symptomatology.    Pt's blood pressure generally runs low by her report. She states that she has been trying to drink more water.  I have advised her to liberalize sodium in her diet (actually adding salt to her foods) in addition to increasing PO fluid intake.  She has been taking some narcotic pain pills that may have a slight effect on her BP which is low to begin with.    I have discussed this plan with the patient and with Dr. Gage.  From the cardiovascular perspective, there is no further inpatient work-up to be undertaken.  I will see the patient in the AM and give her clearance for D/C.

## 2017-11-26 NOTE — DISCHARGE NOTE OB - MEDICATION SUMMARY - MEDICATIONS TO TAKE
I will START or STAY ON the medications listed below when I get home from the hospital:    ibuprofen 600 mg oral tablet  -- 1 tab(s) by mouth every 6 hours  -- Indication: For  delivery delivered    acetaminophen 325 mg oral tablet  -- 3 tab(s) by mouth every 6 hours  -- Indication: For  delivery delivered    oxyCODONE 5 mg oral tablet  -- 1 tab(s) by mouth every 4 hours, As needed, Severe Pain (7 - 10) MDD:6 tabs  -- Indication: For  delivery delivered    albuterol 90 mcg/inh inhalation aerosol  -- 2 puff(s) inhaled every 6 hours, As needed, Shortness of Breath and/or Wheezing  -- Indication: For  delivery delivered    Prenatal Multivitamins with Folic Acid 1 mg oral tablet  -- 1 tab(s) by mouth once a day  -- Indication: For  delivery delivered    docusate sodium 100 mg oral capsule  -- 1 cap(s) by mouth 2 times a day  -- Indication: For  delivery delivered    folic acid 1 mg oral tablet  -- 1 tab(s) by mouth once a day  -- Indication: For  delivery delivered    ascorbic acid 250 mg oral tablet  -- 1 tab(s) by mouth 2 times a day  -- Indication: For  delivery delivered I will START or STAY ON the medications listed below when I get home from the hospital:    ibuprofen 600 mg oral tablet  -- 1 tab(s) by mouth every 6 hours  -- Indication: For  delivery delivered    acetaminophen 325 mg oral tablet  -- 3 tab(s) by mouth every 6 hours  -- Indication: For  delivery delivered    oxyCODONE 5 mg oral tablet  -- 1 tab(s) by mouth every 4 hours, As needed, Severe Pain (7 - 10) MDD:6 tabs  -- Indication: For  delivery delivered    ondansetron 4 mg oral tablet  -- 1 tab(s) by mouth 2 times a day, As Needed -Nausea and/or Vomiting MDD:2  -- Indication: For nausea    albuterol 90 mcg/inh inhalation aerosol  -- 2 puff(s) inhaled every 6 hours, As needed, Shortness of Breath and/or Wheezing  -- Indication: For  delivery delivered    Prenatal Multivitamins with Folic Acid 1 mg oral tablet  -- 1 tab(s) by mouth once a day  -- Indication: For  delivery delivered    docusate sodium 100 mg oral capsule  -- 1 cap(s) by mouth 2 times a day  -- Indication: For  delivery delivered    folic acid 1 mg oral tablet  -- 1 tab(s) by mouth once a day  -- Indication: For  delivery delivered    ascorbic acid 250 mg oral tablet  -- 1 tab(s) by mouth 2 times a day  -- Indication: For  delivery delivered

## 2017-11-26 NOTE — DISCHARGE NOTE OB - CARE PLAN
Principal Discharge DX:	 delivery delivered  Goal:	routine post partum care  Instructions for follow-up, activity and diet:	Reviewed at bedside

## 2017-11-26 NOTE — DISCHARGE NOTE OB - MATERIALS PROVIDED
Shaken Baby Prevention Handout/Birth Certificate Instructions/Breastfeeding Guide and Packet/[facility]  Screening Program/Bottle Feeding Log/Vaccinations/Discharge Medication Information for Patients and Families Pocket Guide/[place]  Immunization Record/Breastfeeding Log/Back To Sleep Handout/[facility] Hearing Screen Program/Breastfeeding Mother’s Support Group Information/Guide to Postpartum Care

## 2017-11-26 NOTE — DISCHARGE NOTE OB - HOSPITAL COURSE
Pt was delivered prior to 36 wks as she began to contract. She had an uncomplicated PCS and post partum course was complicated by pain control issues. Otherwise her course was uncomplicated and she was stable for d/c home on POD 3.

## 2017-11-26 NOTE — DISCHARGE NOTE OB - PATIENT PORTAL LINK FT
“You can access the [facility] Patient Portal, offered by [facility], by registering with the following website: [email or web address]”

## 2017-11-27 VITALS
SYSTOLIC BLOOD PRESSURE: 97 MMHG | DIASTOLIC BLOOD PRESSURE: 61 MMHG | HEART RATE: 93 BPM | RESPIRATION RATE: 18 BRPM | OXYGEN SATURATION: 98 % | TEMPERATURE: 98 F

## 2017-11-27 PROCEDURE — G0463: CPT

## 2017-11-27 PROCEDURE — 86780 TREPONEMA PALLIDUM: CPT

## 2017-11-27 PROCEDURE — 59050 FETAL MONITOR W/REPORT: CPT

## 2017-11-27 PROCEDURE — 80053 COMPREHEN METABOLIC PANEL: CPT

## 2017-11-27 PROCEDURE — 86901 BLOOD TYPING SEROLOGIC RH(D): CPT

## 2017-11-27 PROCEDURE — 59025 FETAL NON-STRESS TEST: CPT

## 2017-11-27 PROCEDURE — 86900 BLOOD TYPING SEROLOGIC ABO: CPT

## 2017-11-27 PROCEDURE — 90715 TDAP VACCINE 7 YRS/> IM: CPT

## 2017-11-27 PROCEDURE — 85027 COMPLETE CBC AUTOMATED: CPT

## 2017-11-27 PROCEDURE — 93005 ELECTROCARDIOGRAM TRACING: CPT

## 2017-11-27 PROCEDURE — 86850 RBC ANTIBODY SCREEN: CPT

## 2017-11-27 RX ORDER — ONDANSETRON 8 MG/1
1 TABLET, FILM COATED ORAL
Qty: 10 | Refills: 0 | OUTPATIENT
Start: 2017-11-27 | End: 2017-12-02

## 2017-11-27 RX ADMIN — Medication 100 MILLIGRAM(S): at 05:30

## 2017-11-27 RX ADMIN — OXYCODONE HYDROCHLORIDE 5 MILLIGRAM(S): 5 TABLET ORAL at 12:13

## 2017-11-27 RX ADMIN — OXYCODONE HYDROCHLORIDE 5 MILLIGRAM(S): 5 TABLET ORAL at 13:00

## 2017-11-27 RX ADMIN — HEPARIN SODIUM 5000 UNIT(S): 5000 INJECTION INTRAVENOUS; SUBCUTANEOUS at 05:30

## 2017-11-27 RX ADMIN — ONDANSETRON 4 MILLIGRAM(S): 8 TABLET, FILM COATED ORAL at 12:26

## 2017-11-27 RX ADMIN — Medication 800 MILLIGRAM(S): at 13:00

## 2017-11-27 RX ADMIN — Medication 800 MILLIGRAM(S): at 05:31

## 2017-11-27 RX ADMIN — Medication 800 MILLIGRAM(S): at 12:26

## 2017-11-27 RX ADMIN — OXYCODONE HYDROCHLORIDE 5 MILLIGRAM(S): 5 TABLET ORAL at 17:38

## 2017-11-27 RX ADMIN — Medication 100 MILLIGRAM(S): at 17:38

## 2017-11-27 NOTE — PROGRESS NOTE ADULT - PROBLEM SELECTOR PLAN 1
- Continue with PCEA  - Increase ambulation  - Continue regular diet  - Renew IV fluids  - Check CBC  - D/c [place]  - Incision dressing removed    ORI Poe, PGY-1
- Continue motrin, tylenol, oxycodone PRN for pain control.  -Continue zofran for nausea   - Increase ambulation  - Continue regular diet  - Discharge planning to be addressed in the AM
- Continue with po analgesia  - Increase ambulation  - Continue regular diet  - IV lock  - CBC today  - Dizziness: S/p cardiology consult, will see patient again this morning and she will follow up as outpatient  - R ear pain: S/p ENT consult, patient states this is improved     ORI Poe, PGY-1
- Continue with po analgesia  - Increase ambulation  - Continue regular diet  - IV lock  - No labs  - Complaints of dizziness earlier: Encouraged PO hydration. Will see how patient feels this morning, denies any current dizziness and will continue to monitor.   - R ear pain: Improved but still present, ENT saw patient and she will follow up outpatient.    ORI Poe, PGY-1

## 2017-11-27 NOTE — PROGRESS NOTE ADULT - ASSESSMENT
38y/o  POD#1 from [facility] for vasa previa at 35w0d, in stable condition.
38yo GPOD#3 s/p LTCS.  Patient is stable. Pt still feeling nauseous.
36y/o  POD#3 from [facility] for vasa previa at 35w0d, in stable condition. Current issues include dizziness for which she has seen cardiology and right ear pain for which she has seen ENT. PMH of asthma.
38 y/o female POD #4  delivery early for vasa previa. Since delivery, symptoms of intermittent dizzy/light-headedness without LOC, self-limited episodes.    EKG noted and, although T-wave abnormality is noted, may be within normal limits for her. No prior EKG available for comparison.    An echocardiogram will be arranged as outpt this week    I have advised her to liberalize sodium in her diet in addition to increasing PO fluid intake, minimizing caffeinated products if possible.  Narcotic pain pills may be contributing to lower BP that has been borderline to begin with.    From the cardiovascular perspective, there is no further inpatient work-up recommended.  She is stable for D/C home today with f/u to my office this week with echo.
38y/o POD#2 from C/S, in stable condition.

## 2017-11-27 NOTE — PROGRESS NOTE ADULT - SUBJECTIVE AND OBJECTIVE BOX
Day _2__ of Anesthesia Pain Management Service    SUBJECTIVE:  Pain Scale Score	At rest: __2_ 	With Activity: ___ 	[  ] Refer to charted pain scores    THERAPY:  [ ] Epidural Bupivacaine 0.0625% and Hydromorphone 10 micrograms/mL  [ ] Epidural Ropivacaine 0.2% plain   [x ] Epidural Bupivacaine 0.01 % and Fentanyl 3 micrograms/mL  (OB)    Demand dose _3__ lockout _15__ (minutes) Continuous Rate _10__       MEDICATIONS  (STANDING):  acetaminophen   Tablet. 975 milliGRAM(s) Oral every 6 hours  ascorbic acid 250 milliGRAM(s) Oral two times a day  docusate sodium 100 milliGRAM(s) Oral two times a day  famotidine Injectable 20 milliGRAM(s) IV Push once  fentaNYL (3 MICROgram(s)/mL) + BUpivacaine 0.01% in 0.9% Sodium Chloride PCEA 250 milliLiter(s) Epidural PCA Continuous  ferrous    sulfate 325 milliGRAM(s) Oral two times a day with meals  folic acid 1 milliGRAM(s) Oral daily  heparin  Injectable 5000 Unit(s) SubCutaneous every 12 hours  ibuprofen  Tablet 600 milliGRAM(s) Oral every 6 hours  lactated ringers Bolus 500 milliLiter(s) IV Bolus once  lactated ringers Bolus 1000 milliLiter(s) IV Bolus once  lactated ringers. 1000 milliLiter(s) (125 mL/Hr) IV Continuous <Continuous>  lactated ringers. 1000 milliLiter(s) (125 mL/Hr) IV Continuous <Continuous>  oxyCODONE    IR 5 milliGRAM(s) Oral every 3 hours  oxytocin Infusion 333.333 milliUNIT(s)/Min (1000 mL/Hr) IV Continuous <Continuous>  prenatal multivitamin 1 Tablet(s) Oral daily  prenatal multivitamin 1 Tablet(s) Oral daily    MEDICATIONS  (PRN):  ALBUTerol    90 MICROgram(s) HFA Inhaler 2 Puff(s) Inhalation every 6 hours PRN Shortness of Breath and/or Wheezing  dexamethasone  Injectable 4 milliGRAM(s) IV Push every 6 hours PRN Nausea, IF ondansetron is ineffective after 30 - 60 minutes  diphenhydrAMINE   Capsule 25 milliGRAM(s) Oral every 6 hours PRN Itching  fentaNYL (3 MICROgram(s)/mL) + BUpivacaine 0.01% in 0.9% Sodium Chloride PCEA Rescue Clinician Bolus 5 milliLiter(s) Epidural every 15 minutes PRN Moderate Pain (4 - 6)  glycerin Suppository - Adult 1 Suppository(s) Rectal at bedtime PRN Constipation  lanolin Ointment 1 Application(s) Topical every 3 hours PRN Sore Nipples  metoclopramide Injectable 10 milliGRAM(s) IV Push once PRN Nausea and/or Vomiting  naloxone Injectable 0.1 milliGRAM(s) IV Push every 3 minutes PRN For ANY of the following changes in patient status:  A. RR LESS THAN 10 breaths per minute, B. Oxygen saturation LESS THAN 90%, C. Sedation score of 6  ondansetron Injectable 4 milliGRAM(s) IV Push every 6 hours PRN Nausea  oxyCODONE    IR 5 milliGRAM(s) Oral every 4 hours PRN Severe Pain (7 - 10)  simethicone 80 milliGRAM(s) Chew every 4 hours PRN Gas      OBJECTIVE:    Assessment of Epidural Catheter Site: 	    [x ] Dressing intact	[ x] Site non-tender	[ x] Site without erythema, discharge, edema  [ x] Epidural tubing and connection checked	[x [ Gross neurological exam within normal limits  [x ] Catheter removed – tip intact		                          9.4    9.7   )-----------( 303      ( 24 Nov 2017 07:11 )             27.8     Vital Signs Last 24 Hrs  T(C): 36.6 (11-25-17 @ 00:02), Max: 36.8 (11-24-17 @ 08:39)  T(F): 97.8 (11-25-17 @ 00:02), Max: 98.2 (11-24-17 @ 08:39)  HR: 79 (11-25-17 @ 00:02) (79 - 92)  BP: 107/68 (11-25-17 @ 00:02) (101/63 - 110/72)  BP(mean): --  RR: 18 (11-25-17 @ 00:02) (18 - 18)  SpO2: 98% (11-25-17 @ 00:02) (97% - 99%)      Sedation Score:	[x ] Alert	[ ] Drowsy	[ ] Arousable  [ ] Asleep  [ ] Unresponsive    Side Effects:	[ x] None	[ ] Nausea	[ ] Vomiting  [ ] Pruritus  		[ ] Weakness  [ ] Numbness  [ ] Other:    ASSESSMENT/ PLAN:    Therapy to  be:	[ ] Continue   [ ] Discontinued   [ x] Change to prn Analgesics    Documentation and Verification of current medications:  [ X ] Done	[ ] Not done, not eligible, reason:    Comments:
OB Postpartum Note:  Delivery, POD#3    S: 38yo  POD#3 s/p LTCS. The patient states that she is still feeling nauseous, but that she has some relief with zofran. She has some dizziness with the nausea which she states has been present since POD #1.  She states that nausea is a normal response to pain for her.  Pain is controlled with medication. She is tolerating a regular diet and passing flatus. She is voiding spontaneously, and ambulating without difficulty. Denies CP/SOB. Denies lightheadedness/dizziness. Denies N/V.    O:  Vitals:  Vital Signs Last 24 Hrs  T(C): 36.7 (2017 20:30), Max: 36.7 (2017 20:30)  T(F): 98 (2017 20:30), Max: 98 (2017 20:30)  HR: 87 (2017 20:30) (80 - 92)  BP: 112/75 (2017 20:30) (104/67 - 112/75)  BP(mean): --  RR: 18 (2017 20:30) (18 - 18)  SpO2: 98% (2017 20:30) (98% - 99%)    MEDICATIONS  (STANDING):  acetaminophen   Tablet. 975 milliGRAM(s) Oral every 6 hours  ascorbic acid 250 milliGRAM(s) Oral two times a day  docusate sodium 100 milliGRAM(s) Oral two times a day  famotidine Injectable 20 milliGRAM(s) IV Push once  ferrous    sulfate 325 milliGRAM(s) Oral two times a day with meals  folic acid 1 milliGRAM(s) Oral daily  heparin  Injectable 5000 Unit(s) SubCutaneous every 12 hours  ibuprofen  Tablet 600 milliGRAM(s) Oral every 6 hours  lactated ringers Bolus 500 milliLiter(s) IV Bolus once  lactated ringers Bolus 1000 milliLiter(s) IV Bolus once  lactated ringers. 1000 milliLiter(s) (125 mL/Hr) IV Continuous <Continuous>  lactated ringers. 1000 milliLiter(s) (125 mL/Hr) IV Continuous <Continuous>  oxyCODONE    IR 5 milliGRAM(s) Oral every 3 hours  oxytocin Infusion 333.333 milliUNIT(s)/Min (1000 mL/Hr) IV Continuous <Continuous>  prenatal multivitamin 1 Tablet(s) Oral daily  prenatal multivitamin 1 Tablet(s) Oral daily    MEDICATIONS  (PRN):  ALBUTerol    90 MICROgram(s) HFA Inhaler 2 Puff(s) Inhalation every 6 hours PRN Shortness of Breath and/or Wheezing  diphenhydrAMINE   Capsule 25 milliGRAM(s) Oral every 6 hours PRN Itching  glycerin Suppository - Adult 1 Suppository(s) Rectal at bedtime PRN Constipation  lanolin Ointment 1 Application(s) Topical every 3 hours PRN Sore Nipples  metoclopramide Injectable 10 milliGRAM(s) IV Push once PRN Nausea and/or Vomiting  ondansetron    Tablet 4 milliGRAM(s) Oral every 6 hours PRN Nausea and/or Vomiting  oxyCODONE    IR 5 milliGRAM(s) Oral every 4 hours PRN Severe Pain (7 - 10)  simethicone 80 milliGRAM(s) Chew every 4 hours PRN Gas      LABS:  Blood type: O Positive  Rubella IgG: RPR: Negative                          9.4<L>   9.7 >-----------< 303    (  @ 07:11 )             27.8<L>                        9.5<L>   16.6<H> >-----------< 306    (  @ 10:26 )             27.4<L>                  Physical exam:  Gen: NAD  Abdomen: Soft, nontender, no distension , firm uterine fundus at umbilicus.  Incision: Clean, dry, and intact   Pelvic: Normal lochia noted  Ext: No calf tenderness          Roxi Strickland PGY-1
[id number] KAMRAT    Subjective:   Patient seen and evalautated at bedside. No VB, no LOF no CTX, Denies SOB, CP, Dizziness, minimal lochia.     T(C): 36.6 (17 @ 13:47), Max: 36.9 (17 @ 00:46)  HR: 83 (17 @ 13:47) (77 - 89)  BP: 110/66 (17 @ 13:47) (95/55 - 110/66)  RR: 18 (17 @ 13:47) (18 - 18)  SpO2: 97% (17 @ 05:44) (95% - 97%)    PE:   Gen: NAD  Chest: CTA b/l RRR  abd: soft nt nd, gravid  ext: nt calves                          10.9   13.8  )-----------( 359      ( 2017 12:19 )             32.2     Assessment/Plan: 37y  at 32+wks with known vasaprevia admitted for inpt monitoring until delivery. Stable  1. vasa previa - mfm following, consult appreciated, nst QD, prn, and bedrest. Maintain active type and screen at all times, monitor for s/sx bleeding ptl.   2. IUP at 32 wks, cont pnv, nst qd  3. MOD - c/s, between 35-36 wks  4. DVT PPx - SQ heparin after HD3
[id number] KAMRAT    Subjective:   Pt seen and evaluated at bedside, no overnight events, No CTX, no lof, no vb, good fm.     T(C): 36.6 (17 @ 08:38), Max: 36.6 (17 @ 08:38)  HR: 98 (17 @ 08:38) (91 - 98)  BP: 109/73 (17 @ 08:38) (105/66 - 109/73)  RR: 18 (17 @ 08:38) (18 - 18)  SpO2: 98% (17 @ 08:38) (98% - 99%)    PE:   Gen: NAD  Chest: CTA b/l RRR  abd: soft nt nd, gravid  ext: nt calves    Assessment/Plan: 37y  at 33wks admitted for vasa previa. Stable  - cont NST daily, sono   - bedrest with wheelchair privileges  - maintain active type and screen  - dvt ppx - heparin SQ  - MOD - c/s at 36wks per mfm - to be scheduled
37yoF  at 33w0d a/w vasa previa at 32wks  Pt seen at bedside. Reports feeling well and had a good night. Denies ctx, LOF, VB. Good FM reported. Denies CP/SOB, fevers and chills. Ambulating. Tolerating diet.    O: T 36.7 HR 81 BP 91/42 RR 18  97%RA  Gen: comfortable, NAD  Abd: soft, NT, gravid  Ext: neg edema b/l, NT    FHT: 140/mod leatha/+accel/no decel  TOco: none    ATU sono : ctx, posterior, BRAULIO 28.4, 2334g (80%ile), CL 3.1cm, vessels within 2cm of internal os c/w vasa previa/velamentous insertion, possible funic presentation,    AP: 36 yo  at 33w0d a/w vasa previa.    1. Vasa Previa  - monitor for bleeding, pad count  - active T&S  - s/p BMZ (-) for FLM  - weekly CL  - per MFM : in hospital admission, BMZ, weekly cervical lengths, planned delivery at 36wks  - scheduled C/S: date TBD    2. Maternal wellbeing  - OOB/HSQ for DVT ppx  - regular diet  3. Fetal wellbeing  - NST daily, BPP prn  - s/p BMZ (-)  - consider rescue steroids prior to delivery
[id number] KAMRAT    Subjective:   Patient seen at bedside, doing well, no compliants. No LOF no VB. no ctx, good FM.     T(C): 36.4 (17 @ 09:00), Max: 36.6 (17 @ 21:00)  HR: 89 (17 @ 09:00) (83 - 93)  BP: 102/61 (17 @ 09:00) (86/55 - 104/68)  RR: 17 (17 @ 09:00) (17 - 18)  SpO2: 96% (17 @ 09:00) (96% - 99%)    PE:   Gen: NAD  Chest: CTA b/l RRR  abd: soft nt nd gravid  ext: nt calves      Assessment/Plan: 37y  at 34+wks with vasa previa, HD11. stable  1. vasa previa - c/s at 36wks, maintain active type and screen at all times  2. iup - nst bid  3. DVT PPx - SQ heparin
[id number] KAMRAT    Subjective:   Patient seen and examined at bedside, pain controlled with PCEA, tolerating regular diet. NO ambulation/flatus yet, bourne in place  Denies SOB, CP, Dizziness, minimal lochia.       T(C): 36.3 (11-23-17 @ 09:50), Max: 36.8 (11-23-17 @ 04:15)  HR: 77 (11-23-17 @ 09:50) (77 - 92)  BP: 101/59 (11-23-17 @ 09:50) (100/51 - 113/56)  RR: 18 (11-23-17 @ 09:50) (17 - 22)  SpO2: 98% (11-23-17 @ 09:50) (98% - 100%)    PE:   Gen: NAD  Chest: CTA b/l RRR  abd: soft nt nd, firm fundus below umbilicus, Incision CDI with steris  ext: nt calves, + SCDs      11-22-17 @ 07:01  -  11-23-17 @ 07:00  --------------------------------------------------------  IN: 1000 mL / OUT: 1750 mL / NET: -750 mL                          9.5    16.6  )-----------( 306      ( 23 Nov 2017 10:26 )             27.4       Assessment/Plan: 37y yo F on POD #0 s/p urgent c/s at 35wks for vasa previa and PT ctx, stable.  1. CS: ADAT, pain control with IV and PCEA, encourage ambulation   2. acute blood loss anemia - h/h appropriate, VSS, start iron/colace/vit C  3. DVT PPx - SQ heparin, SCDs, Ambulation
Day 1 of Anesthesia Pain Management Service    SUBJECTIVE: Patient doing well. No complaints.     Pain Scale Score:    [X] Refer to charted pain scores    THERAPY: Epidural Bupivacaine 0.01 % and Fentanyl 3 micrograms/mL     Demand Dose: 3 mL  Lockout: 15 minutes   Continuous Rate:  10 mL    MEDICATIONS  (STANDING):  acetaminophen   Tablet. 975 milliGRAM(s) Oral every 6 hours  ascorbic acid 250 milliGRAM(s) Oral two times a day  docusate sodium 100 milliGRAM(s) Oral two times a day  famotidine Injectable 20 milliGRAM(s) IV Push once  fentaNYL (3 MICROgram(s)/mL) + BUpivacaine 0.01% in 0.9% Sodium Chloride PCEA 250 milliLiter(s) Epidural PCA Continuous  ferrous    sulfate 325 milliGRAM(s) Oral two times a day with meals  folic acid 1 milliGRAM(s) Oral daily  heparin  Injectable 5000 Unit(s) SubCutaneous every 12 hours  ibuprofen  Tablet 600 milliGRAM(s) Oral every 6 hours  ketorolac   Injectable 30 milliGRAM(s) IV Push every 6 hours  lactated ringers Bolus 500 milliLiter(s) IV Bolus once  lactated ringers Bolus 1000 milliLiter(s) IV Bolus once  lactated ringers. 1000 milliLiter(s) (125 mL/Hr) IV Continuous <Continuous>  lactated ringers. 1000 milliLiter(s) (125 mL/Hr) IV Continuous <Continuous>  oxyCODONE    IR 5 milliGRAM(s) Oral every 3 hours  oxytocin Infusion 333.333 milliUNIT(s)/Min (1000 mL/Hr) IV Continuous <Continuous>  prenatal multivitamin 1 Tablet(s) Oral daily  prenatal multivitamin 1 Tablet(s) Oral daily    MEDICATIONS  (PRN):  ALBUTerol    90 MICROgram(s) HFA Inhaler 2 Puff(s) Inhalation every 6 hours PRN Shortness of Breath and/or Wheezing  dexamethasone  Injectable 4 milliGRAM(s) IV Push every 6 hours PRN Nausea, IF ondansetron is ineffective after 30 - 60 minutes  diphenhydrAMINE   Capsule 25 milliGRAM(s) Oral every 6 hours PRN Itching  fentaNYL (3 MICROgram(s)/mL) + BUpivacaine 0.01% in 0.9% Sodium Chloride PCEA Rescue Clinician Bolus 5 milliLiter(s) Epidural every 15 minutes PRN Moderate Pain (4 - 6)  glycerin Suppository - Adult 1 Suppository(s) Rectal at bedtime PRN Constipation  lanolin Ointment 1 Application(s) Topical every 3 hours PRN Sore Nipples  metoclopramide Injectable 10 milliGRAM(s) IV Push once PRN Nausea and/or Vomiting  naloxone Injectable 0.1 milliGRAM(s) IV Push every 3 minutes PRN For ANY of the following changes in patient status:  A. RR LESS THAN 10 breaths per minute, B. Oxygen saturation LESS THAN 90%, C. Sedation score of 6  ondansetron Injectable 4 milliGRAM(s) IV Push every 6 hours PRN Nausea  oxyCODONE    IR 5 milliGRAM(s) Oral every 4 hours PRN Severe Pain (7 - 10)  simethicone 80 milliGRAM(s) Chew every 4 hours PRN Gas      OBJECTIVE:    Assessment of Epidural Catheter Site: 	    [ ] Dressing intact	[X] Site non-tender	[X] Site without erythema, discharge, edema  [ ] Epidural tubing and connection checked	[X] Gross neurological exam within normal limits  [X] Catheter removed                          9.4    9.7   )-----------( 303      ( 24 Nov 2017 07:11 )             27.8     Vital Signs Last 24 Hrs  T(C): 36.8 (11-24-17 @ 08:39), Max: 36.8 (11-24-17 @ 01:00)  T(F): 98.2 (11-24-17 @ 08:39), Max: 98.2 (11-24-17 @ 01:00)  HR: 92 (11-24-17 @ 08:39) (79 - 92)  BP: 104/69 (11-24-17 @ 09:59) (96/62 - 105/67)  BP(mean): --  RR: 18 (11-24-17 @ 08:39) (18 - 18)  SpO2: 99% (11-24-17 @ 08:39) (97% - 99%)      Sedation Score:	[X] Alert	[ ] Drowsy	[ ] Arousable  [ ] Asleep  [ ] Unresponsive    Side Effects:	[X] None	[ ] Nausea	[ ] Vomiting  [ ] Pruritus  		[ ] Weakness  [ ] Numbness  [ ] Other:    ASSESSMENT/ PLAN:  PLease reconsult as needed.     Therapy:                         [ ] Continue   [X] Discontinue   [X] Change to PRN Analgesics    Documentation and Verification of current medications:  [X] Done	[ ] Not done, not eligible, reason:    Comments:
INTERVAL HPI/OVERNIGHT EVENTS: Pt seen and examined at bedside. good fm, no cramps or ctx or vb or rom  MEDICATIONS  (STANDING):  folic acid 1 milliGRAM(s) Oral daily  prenatal multivitamin 1 Tablet(s) Oral daily    MEDICATIONS  (PRN):  ALBUTerol    90 MICROgram(s) HFA Inhaler 2 Puff(s) Inhalation every 6 hours PRN Shortness of Breath and/or Wheezing      Allergies    No Known Allergies    Intolerances    eggs (Vomiting)      12 point ROS negative except as outlined above    Vital Signs Last 24 Hrs  T(C): 36.6 (10 Nov 2017 08:45), Max: 36.7 (09 Nov 2017 09:40)  T(F): 97.8 (10 Nov 2017 08:45), Max: 98 (09 Nov 2017 09:40)  HR: 101 (10 Nov 2017 08:45) (85 - 101)  BP: 105/68 (10 Nov 2017 08:45) (98/70 - 108/68)  BP(mean): --  RR: 18 (10 Nov 2017 08:45) (18 - 18)  SpO2: 97% (10 Nov 2017 08:45) (96% - 97%)    Last Menstrual Period  3/23/2017 (06 Nov 2017 12:59)      PHYSICAL EXAM:    GA: NAD, A+0 x 3  CV: RRR  Pulm: CTA BL  Breasts: soft, nontender, no palpable masses  Abd: ( + ) BS, soft, nontender, nondistended, no rebound or guarding,   fundus c/w dates, soft, nt  Extremities: no swelling or calf tenderness, reflexes +2 bilaterally    imp- doing well, vasa previa, marginal placenta previa, 32 plus weeks- stable  plan- delivery at 36 weeks, s/p steroids, needs tdap, daily nst, weekly sono                  LABS:                RADIOLOGY & ADDITIONAL TESTS:
INTERVAL HPI/OVERNIGHT EVENTS: Pt seen and examined at bedside. good fm, no ctx, vb or rom  MEDICATIONS  (STANDING):  diphtheria/tetanus/pertussis (acellular) Vaccine (ADAcel) 0.5 milliLiter(s) IntraMuscular once  diphtheria/tetanus/pertussis (acellular) Vaccine (BOOSTRIX) 0.5 milliLiter(s) IntraMuscular once  folic acid 1 milliGRAM(s) Oral daily  prenatal multivitamin 1 Tablet(s) Oral daily    MEDICATIONS  (PRN):  ALBUTerol    90 MICROgram(s) HFA Inhaler 2 Puff(s) Inhalation every 6 hours PRN Shortness of Breath and/or Wheezing          12 point ROS negative except as outlined above    Vital Signs Last 24 Hrs  T(C): 36.6 (10 Nov 2017 21:00), Max: 36.7 (10 Nov 2017 14:54)  T(F): 97.9 (10 Nov 2017 21:00), Max: 98.1 (10 Nov 2017 14:54)  HR: 91 (10 Nov 2017 21:00) (85 - 92)  BP: 101/66 (10 Nov 2017 21:00) (101/66 - 113/73)  RR: 15 (10 Nov 2017 21:00) (15 - 18)  SpO2: 97% (10 Nov 2017 21:00) (97% - 99%)    Last Menstrual Period  3/23/2017 (06 Nov 2017 12:59)      PHYSICAL EXAM:    GA: NAD, A+0 x 3  CV: RRR  Pulm: CTA BL  Breasts: soft, nontender, no palpable masses  Abd: ( + ) BS, soft, nontender, nondistended, no rebound or guarding,   Uterus: c/w dates nt  Extremities: no swelling or calf tenderness, reflexes +2 bilaterally    33 wks vasa previa, marginal placenta previa, stable  plan- daily nst, sono monday, plan for delivery 36 wks
INTERVAL HPI/OVERNIGHT EVENTS: Pt seen and examined at bedside.good fm, no cramps, ctx, vb or rom  MEDICATIONS  (STANDING):  folic acid 1 milliGRAM(s) Oral daily  prenatal multivitamin 1 Tablet(s) Oral daily    MEDICATIONS  (PRN):  ALBUTerol    90 MICROgram(s) HFA Inhaler 2 Puff(s) Inhalation every 6 hours PRN Shortness of Breath and/or Wheezing      Allergies    No Known Allergies    Intolerances    eggs (Vomiting)      12 point ROS negative except as outlined above    Vital Signs Last 24 Hrs  T(C): 36.5 (19 Nov 2017 21:00), Max: 36.7 (19 Nov 2017 14:27)  T(F): 97.7 (19 Nov 2017 21:00), Max: 98.1 (19 Nov 2017 14:27)  HR: 79 (19 Nov 2017 21:00) (79 - 102)  BP: 101/66 (19 Nov 2017 21:00) (101/66 - 117/71)  BP(mean): --  RR: 18 (19 Nov 2017 21:00) (16 - 18)  SpO2: 99% (19 Nov 2017 21:00) (99% - 99%)    Last Menstrual Period  3/23/2017 (06 Nov 2017 12:59)      PHYSICAL EXAM:    GA: NAD, A+0 x 3  CV: RRR  Pulm: CTA BL  Breasts: soft, nontender, no palpable masses  Abd: ( + ) BS, soft, nontender, nondistended, no rebound or guarding,  Extremities: no swelling or calf tenderness, reflexes +2 bilaterally      imp- 34 wks, marginal previa, vasa previa, stable  plan- sono today, nst daily, c-s scheduled 12-1, will follow closely
Patient seen and examined at bedside, no acute overnight events. No acute complaints, pain well controlled. Complains of occasional dizziness still but overall states it is improved. Denies any HA, blurry vision, lightheadedness, CP/SOB, N/V. Patient is ambulating, voiding spontaneously, passing flatus, and tolerating regular diet. Pt is pumping for baby in NICU.     Vital Signs Last 24 Hours  T(C): 37.1 (11-26-17 @ 21:53), Max: 37.1 (11-26-17 @ 21:53)  HR: 77 (11-26-17 @ 21:53) (77 - 80)  BP: 108/71 (11-26-17 @ 21:53) (107/76 - 108/71)  RR: 18 (11-26-17 @ 21:53) (18 - 18)  SpO2: 100% (11-26-17 @ 09:00) (100% - 100%)    Physical Exam:  General: NAD  CV: RRR  Pulm: CTAB  Abdomen: Soft, non-tender, non-distended  Incision: Pfannenstiel incision CDI, subcuticular suture closure  Pelvic: Lochia wnl; fundus firm and non-tender   Extremities: no calf tenderness noted on exam    Labs:    Blood Type: O Positive  Antibody Screen: Negative  RPR: Negative               9.9    9.9   )-----------( 373      ( 11-26 @ 07:43 )             30.1                9.4    9.7   )-----------( 303      ( 11-24 @ 07:11 )             27.8                9.5    16.6  )-----------( 306      ( 11-23 @ 10:26 )             27.4         MEDICATIONS  (STANDING):  ascorbic acid 250 milliGRAM(s) Oral two times a day  docusate sodium 100 milliGRAM(s) Oral two times a day  famotidine Injectable 20 milliGRAM(s) IV Push once  ferrous    sulfate 325 milliGRAM(s) Oral two times a day with meals  folic acid 1 milliGRAM(s) Oral daily  heparin  Injectable 5000 Unit(s) SubCutaneous every 12 hours  ibuprofen  Tablet 800 milliGRAM(s) Oral every 8 hours  lactated ringers Bolus 500 milliLiter(s) IV Bolus once  lactated ringers Bolus 1000 milliLiter(s) IV Bolus once  lactated ringers. 1000 milliLiter(s) (125 mL/Hr) IV Continuous <Continuous>  lactated ringers. 1000 milliLiter(s) (125 mL/Hr) IV Continuous <Continuous>  oxyCODONE    IR 5 milliGRAM(s) Oral every 3 hours  oxytocin Infusion 333.333 milliUNIT(s)/Min (1000 mL/Hr) IV Continuous <Continuous>  polyethylene glycol 3350 17 Gram(s) Oral daily  prenatal multivitamin 1 Tablet(s) Oral daily  prenatal multivitamin 1 Tablet(s) Oral daily    MEDICATIONS  (PRN):  acetaminophen   Tablet. 975 milliGRAM(s) Oral every 6 hours PRN Severe Pain (7 - 10)  ALBUTerol    90 MICROgram(s) HFA Inhaler 2 Puff(s) Inhalation every 6 hours PRN Shortness of Breath and/or Wheezing  diphenhydrAMINE   Capsule 25 milliGRAM(s) Oral every 6 hours PRN Itching  glycerin Suppository - Adult 1 Suppository(s) Rectal at bedtime PRN Constipation  lanolin Ointment 1 Application(s) Topical every 3 hours PRN Sore Nipples  metoclopramide Injectable 10 milliGRAM(s) IV Push once PRN Nausea and/or Vomiting  ondansetron    Tablet 4 milliGRAM(s) Oral every 6 hours PRN Nausea and/or Vomiting  oxyCODONE    IR 5 milliGRAM(s) Oral every 4 hours PRN Severe Pain (7 - 10)  simethicone 80 milliGRAM(s) Chew every 4 hours PRN Gas
Patient seen and examined at bedside, no acute overnight events. No acute complaints, pain well controlled. Does complain of some dizziness overnight. Denies any HA, blurry vision, lightheadedness, CP/SOB, N/V. Patient is ambulating, voiding spontaneously, passing flatus, and tolerating regular diet. Pt is pumping for her baby.    Vital Signs Last 24 Hours  T(C): 36.6 (11-25-17 @ 00:02), Max: 36.8 (11-24-17 @ 08:39)  HR: 79 (11-25-17 @ 00:02) (79 - 92)  BP: 107/68 (11-25-17 @ 00:02) (100/63 - 110/72)  RR: 18 (11-25-17 @ 00:02) (18 - 18)  SpO2: 98% (11-25-17 @ 00:02) (97% - 99%)    Physical Exam:  General: NAD  CV: RRR  Pulm: CTAB  Abdomen: Soft, non-tender, non-distended  Incision: Pfannenstiel incision CDI, subcuticular suture closure  Pelvic: Lochia wnl; fundus firm and non-tender   Extremities: no calf tenderness noted on exam    Labs:    Blood Type: O Positive  Antibody Screen: Negative  RPR: Negative               9.4    9.7   )-----------( 303      ( 11-24 @ 07:11 )             27.8                9.5    16.6  )-----------( 306      ( 11-23 @ 10:26 )             27.4                10.9   13.8  )-----------( 359      ( 11-06 @ 12:19 )             32.2         MEDICATIONS  (STANDING):  acetaminophen   Tablet. 975 milliGRAM(s) Oral every 6 hours  ascorbic acid 250 milliGRAM(s) Oral two times a day  docusate sodium 100 milliGRAM(s) Oral two times a day  famotidine Injectable 20 milliGRAM(s) IV Push once  fentaNYL (3 MICROgram(s)/mL) + BUpivacaine 0.01% in 0.9% Sodium Chloride PCEA 250 milliLiter(s) Epidural PCA Continuous  ferrous    sulfate 325 milliGRAM(s) Oral two times a day with meals  folic acid 1 milliGRAM(s) Oral daily  heparin  Injectable 5000 Unit(s) SubCutaneous every 12 hours  ibuprofen  Tablet 600 milliGRAM(s) Oral every 6 hours  lactated ringers Bolus 500 milliLiter(s) IV Bolus once  lactated ringers Bolus 1000 milliLiter(s) IV Bolus once  lactated ringers. 1000 milliLiter(s) (125 mL/Hr) IV Continuous <Continuous>  lactated ringers. 1000 milliLiter(s) (125 mL/Hr) IV Continuous <Continuous>  oxyCODONE    IR 5 milliGRAM(s) Oral every 3 hours  oxytocin Infusion 333.333 milliUNIT(s)/Min (1000 mL/Hr) IV Continuous <Continuous>  prenatal multivitamin 1 Tablet(s) Oral daily  prenatal multivitamin 1 Tablet(s) Oral daily    MEDICATIONS  (PRN):  ALBUTerol    90 MICROgram(s) HFA Inhaler 2 Puff(s) Inhalation every 6 hours PRN Shortness of Breath and/or Wheezing  dexamethasone  Injectable 4 milliGRAM(s) IV Push every 6 hours PRN Nausea, IF ondansetron is ineffective after 30 - 60 minutes  diphenhydrAMINE   Capsule 25 milliGRAM(s) Oral every 6 hours PRN Itching  fentaNYL (3 MICROgram(s)/mL) + BUpivacaine 0.01% in 0.9% Sodium Chloride PCEA Rescue Clinician Bolus 5 milliLiter(s) Epidural every 15 minutes PRN Moderate Pain (4 - 6)  glycerin Suppository - Adult 1 Suppository(s) Rectal at bedtime PRN Constipation  lanolin Ointment 1 Application(s) Topical every 3 hours PRN Sore Nipples  metoclopramide Injectable 10 milliGRAM(s) IV Push once PRN Nausea and/or Vomiting  naloxone Injectable 0.1 milliGRAM(s) IV Push every 3 minutes PRN For ANY of the following changes in patient status:  A. RR LESS THAN 10 breaths per minute, B. Oxygen saturation LESS THAN 90%, C. Sedation score of 6  ondansetron Injectable 4 milliGRAM(s) IV Push every 6 hours PRN Nausea  oxyCODONE    IR 5 milliGRAM(s) Oral every 4 hours PRN Severe Pain (7 - 10)  simethicone 80 milliGRAM(s) Chew every 4 hours PRN Gas
Postpartum Note,  Section  She is a  37y woman who is now post-operative day #1  She is ambulating.   She is tolerating regular diet.  She denies nausea and vomiting.  She is voiding.  Her pain is controlled.  She reports normal postpartum bleeding.  She is breastfeeding/pumping.  She has mild right ear pain since yesterday.    Physical exam:    Vital Signs Last 24 Hrs  T(C): 36.8 (2017 08:39), Max: 36.8 (2017 01:00)  T(F): 98.2 (2017 08:39), Max: 98.2 (2017 01:00)  HR: 92 (2017 08:39) (79 - 92)  BP: 104/69 (2017 09:59) (96/62 - 105/67)  BP(mean): --  RR: 18 (2017 08:39) (18 - 18)  SpO2: 99% (2017 08:39) (97% - 99%)    Gen: NAD  Abdomen: Soft, nontender, no distension , firm uterine fundus at umbilicus.  Incision: Clean, dry, and intact with steri strips  Ext: No calf tenderness, NT Bilaterally    LABS:                        9.4    9.7   )-----------( 303      ( 2017 07:11 )             27.8                         9.5    16.6  )-----------( 306      ( 2017 10:26 )             27.4                   Allergies    No Known Allergies    Intolerances    eggs (Vomiting)    MEDICATIONS  (STANDING):  acetaminophen   Tablet. 975 milliGRAM(s) Oral every 6 hours  ascorbic acid 250 milliGRAM(s) Oral two times a day  docusate sodium 100 milliGRAM(s) Oral two times a day  famotidine Injectable 20 milliGRAM(s) IV Push once  fentaNYL (3 MICROgram(s)/mL) + BUpivacaine 0.01% in 0.9% Sodium Chloride PCEA 250 milliLiter(s) Epidural PCA Continuous  ferrous    sulfate 325 milliGRAM(s) Oral two times a day with meals  folic acid 1 milliGRAM(s) Oral daily  heparin  Injectable 5000 Unit(s) SubCutaneous every 12 hours  ibuprofen  Tablet 600 milliGRAM(s) Oral every 6 hours  ketorolac   Injectable 30 milliGRAM(s) IV Push every 6 hours  lactated ringers Bolus 500 milliLiter(s) IV Bolus once  lactated ringers Bolus 1000 milliLiter(s) IV Bolus once  lactated ringers. 1000 milliLiter(s) (125 mL/Hr) IV Continuous <Continuous>  lactated ringers. 1000 milliLiter(s) (125 mL/Hr) IV Continuous <Continuous>  oxyCODONE    IR 5 milliGRAM(s) Oral every 3 hours  oxytocin Infusion 333.333 milliUNIT(s)/Min (1000 mL/Hr) IV Continuous <Continuous>  prenatal multivitamin 1 Tablet(s) Oral daily  prenatal multivitamin 1 Tablet(s) Oral daily    MEDICATIONS  (PRN):  ALBUTerol    90 MICROgram(s) HFA Inhaler 2 Puff(s) Inhalation every 6 hours PRN Shortness of Breath and/or Wheezing  dexamethasone  Injectable 4 milliGRAM(s) IV Push every 6 hours PRN Nausea, IF ondansetron is ineffective after 30 - 60 minutes  diphenhydrAMINE   Capsule 25 milliGRAM(s) Oral every 6 hours PRN Itching  fentaNYL (3 MICROgram(s)/mL) + BUpivacaine 0.01% in 0.9% Sodium Chloride PCEA Rescue Clinician Bolus 5 milliLiter(s) Epidural every 15 minutes PRN Moderate Pain (4 - 6)  glycerin Suppository - Adult 1 Suppository(s) Rectal at bedtime PRN Constipation  lanolin Ointment 1 Application(s) Topical every 3 hours PRN Sore Nipples  metoclopramide Injectable 10 milliGRAM(s) IV Push once PRN Nausea and/or Vomiting  naloxone Injectable 0.1 milliGRAM(s) IV Push every 3 minutes PRN For ANY of the following changes in patient status:  A. RR LESS THAN 10 breaths per minute, B. Oxygen saturation LESS THAN 90%, C. Sedation score of 6  ondansetron Injectable 4 milliGRAM(s) IV Push every 6 hours PRN Nausea  oxyCODONE    IR 5 milliGRAM(s) Oral every 4 hours PRN Severe Pain (7 - 10)  simethicone 80 milliGRAM(s) Chew every 4 hours PRN Gas        Assessment and Plan  POD #1  s/p  section at 35 weeks for Vasa previa in labor.  Doing well.  Encouraged ambulation.  Regular Diet  PCEA  Cotninue to monitor    Sumaya Morgan MD
Pt seen and evaluated by me, Stable. see OB R3 note in CPN, agree with assessment.   36yo  at 33+ wks with vasa previa, stable. plan for delivery at 36wks
Pt seen and evaluated, see note in CPN
Subjective: Patient seen and examined. No new events except as noted.     No new complaints.  Reported 1 episode of nausea and dizzy/LH associated with pain upon standing up from bed this AM.    MEDICATIONS:  MEDICATIONS  (STANDING):  ascorbic acid 250 milliGRAM(s) Oral two times a day  docusate sodium 100 milliGRAM(s) Oral two times a day  famotidine Injectable 20 milliGRAM(s) IV Push once  ferrous    sulfate 325 milliGRAM(s) Oral two times a day with meals  folic acid 1 milliGRAM(s) Oral daily  heparin  Injectable 5000 Unit(s) SubCutaneous every 12 hours  ibuprofen  Tablet 800 milliGRAM(s) Oral every 8 hours  lactated ringers Bolus 500 milliLiter(s) IV Bolus once  lactated ringers Bolus 1000 milliLiter(s) IV Bolus once  lactated ringers. 1000 milliLiter(s) (125 mL/Hr) IV Continuous <Continuous>  lactated ringers. 1000 milliLiter(s) (125 mL/Hr) IV Continuous <Continuous>  oxyCODONE    IR 5 milliGRAM(s) Oral every 3 hours  oxytocin Infusion 333.333 milliUNIT(s)/Min (1000 mL/Hr) IV Continuous <Continuous>  polyethylene glycol 3350 17 Gram(s) Oral daily  prenatal multivitamin 1 Tablet(s) Oral daily  prenatal multivitamin 1 Tablet(s) Oral daily      PHYSICAL EXAM:  T(F): 98.1 (11-27-17 @ 10:29), Max: 98.8 (11-26-17 @ 21:53)  HR: 93 (11-27-17 @ 10:29) (77 - 93)  BP: 97/61 (11-27-17 @ 10:29) (97/61 - 110/72)  RR: 18 (11-27-17 @ 10:29) (18 - 18)  SpO2: 98% (11-27-17 @ 10:29) (98% - 98%)  I&O's Summary      Appearance: Normal, NAD	  HEENT:   Normal oral mucosa, PERRL, EOMI	  Cardiovascular: Normal S1 S2, No JVD, I/VI systolic flow murmur heard only at the RUSB, No edema  Respiratory: Lungs clear to auscultation	  Psychiatry: A & O x 3, Mood & affect appropriate  Gastrointestinal:  Soft, Non-tender, + BS, mildly distended (post-partum)  Skin: No rashes, No ecchymoses, No cyanosis	  Neurologic: Non-focal  Extremities: Normal range of motion, No clubbing, cyanosis or edema  Vascular: Peripheral pulses palpable 2+ bilaterally    LABS:                          9.9    9.9   )-----------( 373      ( 26 Nov 2017 07:43 )             30.1     11-26    137  |  101  |  8   ----------------------------<  96  4.3   |  26  |  0.51    Ca    8.8      26 Nov 2017 09:52    TPro  6.1  /  Alb  3.3  /  TBili  0.1<L>  /  DBili  x   /  AST  44<H>  /  ALT  36  /  AlkPhos  78  11-26
Patient seen and examined at bedside, no acute overnight events. No acute complaints, pain well controlled. Denies any HA, blurry vision, dizziness, CP/SOB, N/V. Did complain of some external right ear pain overnight that resolved with tylenol, no erythema or edema noted. Patient is ambulating and tolerating regular diet. Has not yet passed flatus. Singleton is still in place. Pt is pumping for her baby.    Vital Signs Last 24 Hours  T(C): 36.6 (11-24-17 @ 05:56), Max: 36.8 (11-24-17 @ 01:00)  HR: 90 (11-24-17 @ 05:56) (77 - 90)  BP: 100/63 (11-24-17 @ 05:56) (96/62 - 105/67)  RR: 18 (11-24-17 @ 05:56) (18 - 18)  SpO2: 99% (11-24-17 @ 05:56) (97% - 99%)    I&O's Summary    23 Nov 2017 07:01  -  24 Nov 2017 07:00  --------------------------------------------------------  IN: 0 mL / OUT: 7600 mL / NET: -7600 mL        Physical Exam:  General: NAD  CV: RRR  HEENT: no edema, erythema or tenderness noted on palpation of R external ear   Pulm: CTAB  Abdomen: Soft, non-tender, non-distended  Incision: Pfannenstiel incision CDI, subcuticular suture closure  Pelvic: Lochia wnl; fundus firm and non-tender   Extremities: no calf tenderness noted on exam    Labs:    Blood Type: O Positive  Antibody Screen: Negative  RPR: Negative               9.5    16.6  )-----------( 306      ( 11-23 @ 10:26 )             27.4                10.9   13.8  )-----------( 359      ( 11-06 @ 12:19 )             32.2         MEDICATIONS  (STANDING):  acetaminophen   Tablet. 975 milliGRAM(s) Oral every 6 hours  famotidine Injectable 20 milliGRAM(s) IV Push once  fentaNYL (3 MICROgram(s)/mL) + BUpivacaine 0.01% in 0.9% Sodium Chloride PCEA 250 milliLiter(s) Epidural PCA Continuous  ferrous    sulfate 325 milliGRAM(s) Oral daily  folic acid 1 milliGRAM(s) Oral daily  heparin  Injectable 5000 Unit(s) SubCutaneous every 12 hours  ibuprofen  Tablet 600 milliGRAM(s) Oral every 6 hours  ketorolac   Injectable 30 milliGRAM(s) IV Push every 6 hours  lactated ringers Bolus 500 milliLiter(s) IV Bolus once  lactated ringers Bolus 1000 milliLiter(s) IV Bolus once  lactated ringers. 1000 milliLiter(s) (125 mL/Hr) IV Continuous <Continuous>  lactated ringers. 1000 milliLiter(s) (125 mL/Hr) IV Continuous <Continuous>  oxyCODONE    IR 5 milliGRAM(s) Oral every 3 hours  oxytocin Infusion 333.333 milliUNIT(s)/Min (1000 mL/Hr) IV Continuous <Continuous>  prenatal multivitamin 1 Tablet(s) Oral daily  prenatal multivitamin 1 Tablet(s) Oral daily    MEDICATIONS  (PRN):  ALBUTerol    90 MICROgram(s) HFA Inhaler 2 Puff(s) Inhalation every 6 hours PRN Shortness of Breath and/or Wheezing  dexamethasone  Injectable 4 milliGRAM(s) IV Push every 6 hours PRN Nausea, IF ondansetron is ineffective after 30 - 60 minutes  diphenhydrAMINE   Capsule 25 milliGRAM(s) Oral every 6 hours PRN Itching  docusate sodium 100 milliGRAM(s) Oral two times a day PRN Stool Softening  fentaNYL (3 MICROgram(s)/mL) + BUpivacaine 0.01% in 0.9% Sodium Chloride PCEA Rescue Clinician Bolus 5 milliLiter(s) Epidural every 15 minutes PRN Moderate Pain (4 - 6)  glycerin Suppository - Adult 1 Suppository(s) Rectal at bedtime PRN Constipation  lanolin Ointment 1 Application(s) Topical every 3 hours PRN Sore Nipples  metoclopramide Injectable 10 milliGRAM(s) IV Push once PRN Nausea and/or Vomiting  naloxone Injectable 0.1 milliGRAM(s) IV Push every 3 minutes PRN For ANY of the following changes in patient status:  A. RR LESS THAN 10 breaths per minute, B. Oxygen saturation LESS THAN 90%, C. Sedation score of 6  ondansetron Injectable 4 milliGRAM(s) IV Push every 6 hours PRN Nausea  oxyCODONE    IR 5 milliGRAM(s) Oral every 4 hours PRN Severe Pain (7 - 10)  simethicone 80 milliGRAM(s) Chew every 4 hours PRN Gas

## 2017-11-27 NOTE — PROGRESS NOTE ADULT - ATTENDING COMMENTS
Pt seen and examined at bedside, feels well, no complaints, no OB complaints. no LOF no VB.   abd; gravid, benign  a/p:  at 33wk 0days today with vasa previa, stable. Plan for delivery at 36wks.
Gene Baron MD  [phone]
Patient was seen and examined.  I agree with the above note.  Discussed with patient to uli CONDE if fever, severe pain, Heavy bleeding, headache, visual changes.  Discharge instructions reviewed w patient as written in discharged note.    Sumaya Morgan MD
Pt c/o continue pain control issues this AM (pain not well controlled when moving from lying to sitting and from sitting to standing). Also continues to c/o dizziness with pain. Bleeding well controlled. H&H stable this AM. Tolerating PO with minimal nausea (pt usually has nausea associated with cramping). She took antiemetics last night with oxycodone and was able to tolerate meds. Exam wnl. Will order EKG, orthostatics, CMP, and cards consult to insure no cardiac or electrolyte abnormality or AFLP (very unlikely).  Will also increase tylenol to 975mg q6hrs and motrin to 800mg q8hrs to better control pain.  Encouraged pt to take oxycodone as needed more frequently as well. Pt not to get out of bed without assistance given dizziness and fall risk.  Will observe pt overnight and if improved will go home tomorrow.     TAI Gage MD   [phone]
Pt seen and examined by me. Pt had nausea but no vomiting and some dizziness. Pain moderately controlled with PO pain meds. Bleeding wnl.  Will alternate tylenol and motrin and hold oxy until bedtime.  Pt informed to not get out of bed without assistance. Otherwise pt without complaints. Routine post partum care.     TAI Gage MD  [phone]

## 2017-12-11 LAB — SURGICAL PATHOLOGY STUDY: SIGNIFICANT CHANGE UP

## 2022-11-01 NOTE — DIETITIAN INITIAL EVALUATION ADULT. - WEIGHT IN KG
Dr. Ghosh's Advice for Rehab:    Definitions and Important Background:  The starting position of an exercise is the position you are in at the beginning of the exercise before any motion has occurred.  The peak position of an exercise is the point at which you stop moving away from the starting position and are about to start moving back towards the starting position.  A rep (repetition) is a single complete motion from the starting position to the peak position, and back to the starting position.  A hold is completed by staying motionless at the peak position. Holds can be done instead of reps for a couple reasons: 1) to help the exercise be more likely to be pain-free, or 2) to mix up the exercises and keep your muscles guessing, which can strengthen them more quickly. Holds are a method of isometric strengthening.  Isometric strengthening is when your muscle is engaged and working, but the joints of the arm or leg are not moving. For example, holding a peak position against gravity or pushing/pulling/rotating against an immovable object like a wall.   Eccentric strengthening is most easily accomplished by using resistance bands. It involves the muscle working/anita while you slowly return to the starting position. It is a VERY effective way to strengthen and rehabilitate muscle.   Stretching lengthens muscles and tendons, typically creating a pulling sensation. Tendons naturally relax and lengthen further as they are stretched with constant pressure over time.     Advice for all Stretching Exercises:  Hold a deep, slow, persistent stretch for a full minute (do NOT bounce during the stretch). During that minute, every 5-10 seconds inhale & exhale deeply and push into a deeper stretch and hold the new position for another 5-10 seconds, then keep repeating and going deeper into the stretch over that full minute. It is OK if you feel a stretching type of pain/discomfort- continue to stretch through this and  it will typically get better over time.  BEWARE that the first week you start stretching it may become MORE painful since you are first starting out. But if you are persistent and regular with your routine, you should start to see improvement in symptoms and comfort after about 7-10 days and it should keep improving from there.  A stretch strap can be very useful, such as the one sold at https://[employer]/  Please note that I have no affiliation with this site. However, I have this stretch strap at home and can vouch for its quality.    Advice for all Strengthening Exercises:  Move slow and controlled THROUGHOUT the strengthening motion (especially when returning to the starting position of the exercise). A slow count to 'four' as you move away from the starting position (towards the peak position) and another slow count to 'four' as you return back to the starting position (away from the peak position) typically works best.  If a strengthening exercise is painful then try a different exercise or a modification. Typically, it can be made less painful if you simply hold the peak position (the point at which you stop moving away from the starting position and are about to begin moving back towards the starting position) for 30 or 60 seconds as you are able.  There is no specific number of reps or sets that will get you better. The muscle needs to be worked until it fatigues in order to strengthen it and improve your condition. Typically, this means 10-15 reps for 3-5 sets in a row of any one particular strengthening exercise in a session. If holding a peak position or balancing, then 30-60 second holds are recommended for 3-5 sets in a row of any one particular strength or balance exercise in a session.  I reference using resistance bands for many strengthening exercises. Any type can work. The simplest and most economical are a set of 3-5 resistance loop bands that can be found, for example, at a sporting  [employer] store, [employer], online at [employer] for as low as $12 for a set, at https://www.[employer]/.    Advice for all Balancing Exercises:  Perform the exercise near a countertop, wall, post, chair, table, walker, or other sturdy structure so that you can easily and quickly steady yourself if you begin to lose your balance. If you are still not confident with practicing this on your own, then ask a partner to help you balance and avoid falls.  It is typically best to perform simple balance exercises barefoot to engage the small foot muscles more and train your foot skin nerves as they directly sense the ground underneath you. This helps improve your balance by training your muscles and sensory nerves to work together. If performing sport-specific training involving jumps, then using a tennis shoe to help absorb the impact is advised.    Frequently Asked Questions  How often should I perform the exercises?  All patients and their conditions/injuries/surgeries are different. However, typically AT LEAST 30 minutes of dedicated exercises performed every day are needed for you to improve. Some conditions are worse than others and require up to 60 minutes a day. Let your pain and progress be your guide.   How many times per day should I be doing exercises?  For most people, breaking the exercises into 2 or 3 sessions per day works best. For example, two 15-30 minute sessions or three 10-20 minute sessions can be effective and adds up to 30-60 minutes of exercises per day. Only the recommended rehab exercises should count towards this time. For example if you are performing aerobic exercise (bicycling, walking, swimming, elliptical, running, etc.), that time does not count towards your rehab exercise time.   When will I see improvement?  If you perform the exercises as recommended, almost all patients see early improvement within 4 weeks. Within 6-8 weeks significant improvement will likely be noted. Complete resolution  may take 3-4 months, depending on the severity of your condition.  Should I be doing aerobic exercise during my rehab?  Low-impact aerobic exercise (bicycling (outside, stationary, or recumbent), spinning, walking, elliptical, or swimming) is beneficial in many ways and can be continued if it does not cause discomfort or worsening of your symptoms.   Should I use heat or cold?  If there is visible swelling do NOT use heat. Otherwise, heating pads are OK to use over the muscles as it can help them relax, especially immediately before stretching. 5-10 minutes 2-3 times a day is usually enough. Icing or cold packs/machines are best used over joints or areas that have just had surgery or injury, especially when there is noticeable swelling. 20-30 minutes of icing 2-3 times a day is usually enough. Typically, this is best performed at the end of the day. Give your skin at least a 30 minute break between icing sessions. Do not put cold packs directly on skin (use light clothing or paper towel as a barrier to avoid frostbite). If you have a broken bone (fracture), recently had surgery, or other major injury then you may need more time icing.   Should I massage the area or use a roller?  These can be helpful additions to your treatment regimen but are not the focus of my recommendations as rolling/massage alone (without performing the exercises) is not likely to provide lasting improvement.    ------------------------------------------------------------------------------------------------------------  SUPINE IT BAND / GLUT / PIRIFORMIS STRETCH - KNEE TO CHEST PULL    Note: In this example, the patient is stretching the RIGHT hip.  Lie on your back with both knees bent. Bring the knee of the affected hip toward your chest and cross your leg so that the ankle of the affected hip rests on your opposite thigh as shown. Pull the knee of the affected hip toward the opposite shoulder until a stretch is felt in the affected  hip.  Hold 1 Minute, Complete 3 Sets  Perform 3 Times a Day      IT BAND / GLUT STRETCH - CROSSOVER    Note: In this example, the patient is stretching the RIGHT hip  muscles.  Sit with one knee straight and the other bent and placed over the opposite knee (the entire foot can be placed over the other thigh if you are able to do this). Then gently rotate your body towards the bent knee side. Use your elbow to lever your knee into a stretch. You should feel it in the side of the hip and thigh on the side that you are turning towards.  Hold 1 Minute, Complete 3 Sets   Perform 3 Times a Day      STANDING IT BAND STRETCH ON WALL    Note: In this example, the patient is stretching the RIGHT hip.  Stand about 2 feet from a wall with the hip you want to stretch closest to the wall as shown. Cross your leg behind the non-stretch-side leg so that your feet are side by side. Place your hand on the wall at shoulder height. Using your non-stretch-side hand, push your hips toward the wall until a stretch is felt on the  outside of the hip and thigh closest to the wall.  Hold 1 Minute, Complete 3 Sets   Perform 3 Times a Day      SINGLE LEG BALANCE    While barefoot, stand on one leg and maintain your balance. Have a sturdy chair, countertop, wall, or partner close by in case you need assistance to re-balance yourself. Work up to being able to hold your balance for 1 minute at a time.  Hold 1 Minute, Complete 3 Sets  Perform 3 Times a Day    EASIER modification: start with two hands assisting to hold your balance and try to only use one hand (or no hands) as much as safely possible.    HARDER modifications: 1) Close your eyes; 2) Stand on a balance pad (foam pad) or towel/pillow/blanket; 3) Move your torso (movement at the waist) by bending forward, backward, to each side, twisting; 4) use a Bosu ball (right side up and upside down); 5) throw a tennis ball against a wall and catch it while still balancing (or have partner throw).  A medicine ball can work for this too.      SUPINE STRAIGHT LEG RAISE    While lying on your back (this is called the supine position), raise up your leg with a straight knee. Keep the opposite knee bent with the foot planted on the ground (this takes some strain off your back). The key to the exercise is to go very slowly, raising no faster than a slow count to 'four' on the way up and 'four' on the way down. Only raise the leg as high up as is comfortable- about as high or slightly less than what the picture shows (your pelvis/back should not move/strain).  Repeat 15 Times, Complete 3 Sets   Perform 3 Times a Day    If you find the up and down motion painful, then go even more slowly and do not raise the leg as high. Alternatively, try the modification.    MODIFICATION: Raise the leg up slowly as shown, but then hold the position with the leg in the air for 30 seconds (or as long as you can). Alternate legs and repeat for a total of 10 holds per leg.    HARDER MODIFICATIONS: 1) keep both knees straight and use a resistance band looped around both ankles; 2) use ankle weights.      HIP ADDUCTION - SIDE LYING    Your top leg should be bent at the knee and your foot planted on the ground supporting your body. While lying on your side, slowly raise up your bottom leg towards the ceiling. Keep your knee straight the entire time. Hold the position for 30 seconds.  Hold 30 Seconds, Complete 3 Sets  Perform 3 Times a Day    HARDER MODIFICATION: use an ankle weight      STANDING HIP ADDUCTION WITH BAND    Stand to the side of the anchor of the elastic band. Attach the band to the closest leg's ankle. While MAINTAINING POSTURE, slowly move your leg toward your far leg, creating more tension in the band. Slowly return to the starting position and repeat. Count slowly to 'four' as the band tightens, and slowly to 'four' as the band loosens and your leg returns to the starting position.  Repeat 15 Times, Complete 3  Sets  Perform 3 Times a Day    ------------------------------------------------------------------------------------------------------------         82.5

## 2023-03-24 NOTE — DIETITIAN INITIAL EVALUATION ADULT. - PATIENT PROFILE REVIEWED
Sivan Wise, APRN   3/20/2023  6:39 PM CDT       Please let patient know her HRCT did not show any evidence of Interstitial lung disease. It did show mild emphysema. We will discuss further in the office.      Relayed results to patient and she voiced understanding.    yes